# Patient Record
Sex: MALE | Race: BLACK OR AFRICAN AMERICAN | NOT HISPANIC OR LATINO | Employment: FULL TIME | URBAN - METROPOLITAN AREA
[De-identification: names, ages, dates, MRNs, and addresses within clinical notes are randomized per-mention and may not be internally consistent; named-entity substitution may affect disease eponyms.]

---

## 2018-08-01 NOTE — PROGRESS NOTES
This note was created by combination of typed  and Dragon dictation.  Transcription errors may be present.  If there are any questions, please contact me.    Assessment & Plan:   Normal physical exam  Class 2 obesity due to excess calories without serious comorbidity with body mass index (BMI) of 37.0 to 37.9 in adult  -fam hx of DM and fam hx of sickle cell.  He should at least have sickle trait.   Recommended diet modification/portion control, and weight loss given fam hx of DM.  -     CBC auto differential; Future; Expected date: 08/02/2018  -     Comprehensive metabolic panel; Future; Expected date: 08/02/2018  -     Lipid panel; Future; Expected date: 08/02/2018  -     Hemoglobin A1c; Future; Expected date: 08/02/2018  -     TSH; Future; Expected date: 08/02/2018  -     PSA, Screening; Future; Expected date: 08/02/2018    Screening for colon cancer  -     Case request GI: COLONOSCOPY    Need for Tdap vaccination  -     (In Office Administered) Tdap Vaccine      There are no discontinued medications.  Modified Medications    No medications on file     New Prescriptions    No medications on file       Follow Up: No Follow-up on file. if all normal PEx 1 year.        Subjective:     Chief Complaint   Patient presents with    Annual Exam       HPI  Terrie is a 53 y.o. male, last appointment with this clinic was Visit date not found.    NP  Reports a hx of hyperlipidemia not enough to warrant medication.  This was maybe 4-5 years ago.   Reports an episode of hypoglycemia a year ago at [facility] - a screening test - asymptomatic.   fam hx of sickle cell - 2 brothers.    Ate a banana today.     BMI 37. Discussed importance of maintaining lower BMI and impact on chronic health conditions and with fam hx of DM, risk of DM himself.    Diet - home cooking. No added salts.  Adequate vegetables.  Sometimes diet Coke.    Physical activity - with work.     Patient Care Team:  Inderjit Loco MD as PCP - General  (Internal Medicine)    There are no active problems to display for this patient.      PAST MEDICAL HISTORY:  History reviewed. No pertinent past medical history.    PAST SURGICAL HISTORY:  History reviewed. No pertinent surgical history.    Family History   Problem Relation Age of Onset    Heart disease Mother     Diabetes Mother     Diabetes Father     No Known Problems Sister     Diabetes Brother     Sickle cell anemia Brother     Sickle cell anemia Brother     No Known Problems Sister     No Known Problems Sister     No Known Problems Sister     No Known Problems Brother     No Known Problems Brother     No Known Problems Daughter     No Known Problems Daughter     No Known Problems Son        SOCIAL HISTORY:  Social History     Social History    Marital status:      Spouse name: N/A    Number of children: N/A    Years of education: N/A     Occupational History    [employer] - .       Social History Main Topics    Smoking status: Never Smoker    Smokeless tobacco: Never Used    Alcohol use 2.4 oz/week     4 Cans of beer per week      Comment: 3-4 times a week, 1 at a time    Drug use: No    Sexual activity: Yes     Other Topics Concern    Not on file     Social History Narrative    No narrative on file       ALLERGIES AND MEDICATIONS: updated and reviewed.  Review of patient's allergies indicates:  Allergies not on file  No current outpatient prescriptions on file.     No current facility-administered medications for this visit.        Review of Systems   Constitutional: Negative for fever, malaise/fatigue and weight loss.   HENT: Negative for congestion.    Eyes: Negative for blurred vision and pain.   Respiratory: Negative for shortness of breath and wheezing.    Cardiovascular: Negative for chest pain, palpitations and leg swelling.   Gastrointestinal: Negative for abdominal pain, blood in stool, constipation, diarrhea and heartburn.   Genitourinary:  "Negative for dysuria, hematuria and urgency.   Musculoskeletal: Negative for joint pain.   Neurological: Negative for tingling, focal weakness, weakness and headaches.       Objective:   Physical Exam   Vitals:    08/02/18 1032   BP: 130/80   Pulse: 84   Temp: 98.1 °F (36.7 °C)   SpO2: 97%   Weight: 114.9 kg (253 lb 6.7 oz)   Height: 5' 9" (1.753 m)    Body mass index is 37.42 kg/m².  Weight: 114.9 kg (253 lb 6.7 oz)   Height: 5' 9" (175.3 cm)     Physical Exam   Constitutional: He is oriented to person, place, and time. He appears well-developed and well-nourished.   HENT:   Right Ear: Tympanic membrane and external ear normal.   Left Ear: Tympanic membrane and external ear normal.   Nose: Nose normal.   Mouth/Throat: Oropharynx is clear and moist.   Eyes: EOM are normal.   Neck: Trachea normal. Carotid bruit is not present. No thyroid mass and no thyromegaly present.   Cardiovascular: Normal rate, regular rhythm, S1 normal, S2 normal, normal heart sounds and intact distal pulses.    No murmur heard.  Pulmonary/Chest: Effort normal and breath sounds normal.   Abdominal: Soft. He exhibits no mass. There is no splenomegaly or hepatomegaly. There is no tenderness.   Musculoskeletal: He exhibits no edema or deformity.   Lymphadenopathy:     He has no cervical adenopathy.     He has no axillary adenopathy.   Neurological: He is alert and oriented to person, place, and time. He has normal strength and normal reflexes. No cranial nerve deficit or sensory deficit.   Skin: Skin is warm and dry. No rash (on exposed skin) noted.   On exposed skin   Psychiatric: He has a normal mood and affect. His speech is normal and behavior is normal. Thought content normal.     "

## 2018-08-02 ENCOUNTER — OFFICE VISIT (OUTPATIENT)
Dept: FAMILY MEDICINE | Facility: CLINIC | Age: 53
End: 2018-08-02
Payer: COMMERCIAL

## 2018-08-02 ENCOUNTER — LAB VISIT (OUTPATIENT)
Dept: LAB | Facility: HOSPITAL | Age: 53
End: 2018-08-02
Attending: INTERNAL MEDICINE
Payer: COMMERCIAL

## 2018-08-02 VITALS
WEIGHT: 253.44 LBS | OXYGEN SATURATION: 97 % | HEART RATE: 84 BPM | DIASTOLIC BLOOD PRESSURE: 80 MMHG | HEIGHT: 69 IN | SYSTOLIC BLOOD PRESSURE: 130 MMHG | TEMPERATURE: 98 F | BODY MASS INDEX: 37.54 KG/M2

## 2018-08-02 DIAGNOSIS — E66.09 CLASS 2 OBESITY DUE TO EXCESS CALORIES WITHOUT SERIOUS COMORBIDITY WITH BODY MASS INDEX (BMI) OF 37.0 TO 37.9 IN ADULT: ICD-10-CM

## 2018-08-02 DIAGNOSIS — Z23 NEED FOR TDAP VACCINATION: ICD-10-CM

## 2018-08-02 DIAGNOSIS — Z00.00 NORMAL PHYSICAL EXAM: Primary | ICD-10-CM

## 2018-08-02 DIAGNOSIS — Z00.00 NORMAL PHYSICAL EXAM: ICD-10-CM

## 2018-08-02 DIAGNOSIS — Z12.11 SCREENING FOR COLON CANCER: ICD-10-CM

## 2018-08-02 LAB
ALBUMIN SERPL BCP-MCNC: 3.9 G/DL
ALP SERPL-CCNC: 53 U/L
ALT SERPL W/O P-5'-P-CCNC: 28 U/L
ANION GAP SERPL CALC-SCNC: 7 MMOL/L
AST SERPL-CCNC: 29 U/L
BASOPHILS # BLD AUTO: 0.04 K/UL
BASOPHILS NFR BLD: 0.8 %
BILIRUB SERPL-MCNC: 0.5 MG/DL
BUN SERPL-MCNC: 15 MG/DL
CALCIUM SERPL-MCNC: 9.2 MG/DL
CHLORIDE SERPL-SCNC: 104 MMOL/L
CHOLEST SERPL-MCNC: 223 MG/DL
CHOLEST/HDLC SERPL: 4.2 {RATIO}
CO2 SERPL-SCNC: 25 MMOL/L
COMPLEXED PSA SERPL-MCNC: 0.25 NG/ML
CREAT SERPL-MCNC: 1.2 MG/DL
DIFFERENTIAL METHOD: ABNORMAL
EOSINOPHIL # BLD AUTO: 0.2 K/UL
EOSINOPHIL NFR BLD: 3.5 %
ERYTHROCYTE [DISTWIDTH] IN BLOOD BY AUTOMATED COUNT: 14.5 %
EST. GFR  (AFRICAN AMERICAN): >60 ML/MIN/1.73 M^2
EST. GFR  (NON AFRICAN AMERICAN): >60 ML/MIN/1.73 M^2
ESTIMATED AVG GLUCOSE: 126 MG/DL
GLUCOSE SERPL-MCNC: 98 MG/DL
HBA1C MFR BLD HPLC: 6 %
HCT VFR BLD AUTO: 44.8 %
HDLC SERPL-MCNC: 53 MG/DL
HDLC SERPL: 23.8 %
HGB BLD-MCNC: 14.1 G/DL
IMM GRANULOCYTES # BLD AUTO: 0.01 K/UL
IMM GRANULOCYTES NFR BLD AUTO: 0.2 %
LDLC SERPL CALC-MCNC: 144.4 MG/DL
LYMPHOCYTES # BLD AUTO: 2 K/UL
LYMPHOCYTES NFR BLD: 38.6 %
MCH RBC QN AUTO: 27.7 PG
MCHC RBC AUTO-ENTMCNC: 31.5 G/DL
MCV RBC AUTO: 88 FL
MONOCYTES # BLD AUTO: 0.7 K/UL
MONOCYTES NFR BLD: 13.1 %
NEUTROPHILS # BLD AUTO: 2.2 K/UL
NEUTROPHILS NFR BLD: 43.8 %
NONHDLC SERPL-MCNC: 170 MG/DL
NRBC BLD-RTO: 0 /100 WBC
PLATELET # BLD AUTO: 218 K/UL
PMV BLD AUTO: 10.5 FL
POTASSIUM SERPL-SCNC: 4.3 MMOL/L
PROT SERPL-MCNC: 7.8 G/DL
RBC # BLD AUTO: 5.09 M/UL
SODIUM SERPL-SCNC: 136 MMOL/L
TRIGL SERPL-MCNC: 128 MG/DL
TSH SERPL DL<=0.005 MIU/L-ACNC: 0.76 UIU/ML
WBC # BLD AUTO: 5.11 K/UL

## 2018-08-02 PROCEDURE — 99999 PR PBB SHADOW E&M-EST. PATIENT-LVL III: CPT | Mod: PBBFAC,,, | Performed by: INTERNAL MEDICINE

## 2018-08-02 PROCEDURE — 80061 LIPID PANEL: CPT

## 2018-08-02 PROCEDURE — 90471 IMMUNIZATION ADMIN: CPT | Mod: S$GLB,,, | Performed by: INTERNAL MEDICINE

## 2018-08-02 PROCEDURE — 99386 PREV VISIT NEW AGE 40-64: CPT | Mod: 25,S$GLB,, | Performed by: INTERNAL MEDICINE

## 2018-08-02 PROCEDURE — 84153 ASSAY OF PSA TOTAL: CPT

## 2018-08-02 PROCEDURE — 84443 ASSAY THYROID STIM HORMONE: CPT

## 2018-08-02 PROCEDURE — 80053 COMPREHEN METABOLIC PANEL: CPT

## 2018-08-02 PROCEDURE — 83036 HEMOGLOBIN GLYCOSYLATED A1C: CPT

## 2018-08-02 PROCEDURE — 36415 COLL VENOUS BLD VENIPUNCTURE: CPT | Mod: PO

## 2018-08-02 PROCEDURE — 85025 COMPLETE CBC W/AUTO DIFF WBC: CPT

## 2018-08-02 PROCEDURE — 90715 TDAP VACCINE 7 YRS/> IM: CPT | Mod: S$GLB,,, | Performed by: INTERNAL MEDICINE

## 2018-08-03 DIAGNOSIS — Z11.59 NEED FOR HEPATITIS C SCREENING TEST: ICD-10-CM

## 2018-08-03 PROBLEM — R73.03 PREDIABETES: Status: ACTIVE | Noted: 2018-08-03

## 2018-08-03 NOTE — PROGRESS NOTES
a1c preDM 6.0  Lipid not ideal with elevated glc nonHDL < 190  PSA WNL  CBC WNL; fam hx of sickle cell; normocytic  TSH WNL  Results to pt. Had talked about importance of diet/weight loss at OV.  PEx 1 year.

## 2018-10-12 ENCOUNTER — PATIENT MESSAGE (OUTPATIENT)
Dept: FAMILY MEDICINE | Facility: CLINIC | Age: 53
End: 2018-10-12

## 2018-10-17 ENCOUNTER — PATIENT MESSAGE (OUTPATIENT)
Dept: FAMILY MEDICINE | Facility: CLINIC | Age: 53
End: 2018-10-17

## 2018-10-29 DIAGNOSIS — Z12.11 COLON CANCER SCREENING: Primary | ICD-10-CM

## 2018-11-12 PROBLEM — Z12.11 SCREENING FOR COLON CANCER: Status: ACTIVE | Noted: 2018-11-12

## 2018-11-19 ENCOUNTER — ANESTHESIA EVENT (OUTPATIENT)
Dept: ENDOSCOPY | Facility: HOSPITAL | Age: 53
End: 2018-11-19
Payer: COMMERCIAL

## 2018-11-19 ENCOUNTER — HOSPITAL ENCOUNTER (OUTPATIENT)
Facility: HOSPITAL | Age: 53
Discharge: HOME | End: 2018-11-19
Attending: COLON & RECTAL SURGERY | Admitting: COLON & RECTAL SURGERY
Payer: COMMERCIAL

## 2018-11-19 ENCOUNTER — ANESTHESIA (OUTPATIENT)
Dept: ENDOSCOPY | Facility: HOSPITAL | Age: 53
End: 2018-11-19
Payer: COMMERCIAL

## 2018-11-19 VITALS
BODY MASS INDEX: 35.55 KG/M2 | SYSTOLIC BLOOD PRESSURE: 135 MMHG | HEART RATE: 78 BPM | WEIGHT: 240 LBS | OXYGEN SATURATION: 97 % | TEMPERATURE: 98 F | HEIGHT: 69 IN | DIASTOLIC BLOOD PRESSURE: 71 MMHG | RESPIRATION RATE: 15 BRPM

## 2018-11-19 DIAGNOSIS — Z12.11 SCREENING FOR COLON CANCER: Primary | ICD-10-CM

## 2018-11-19 PROCEDURE — 37000009 HC ANESTHESIA EA ADD 15 MINS: Performed by: COLON & RECTAL SURGERY

## 2018-11-19 PROCEDURE — 45380 COLONOSCOPY AND BIOPSY: CPT | Mod: 33,,, | Performed by: COLON & RECTAL SURGERY

## 2018-11-19 PROCEDURE — 27201012 HC FORCEPS, HOT/COLD, DISP: Performed by: COLON & RECTAL SURGERY

## 2018-11-19 PROCEDURE — 37000008 HC ANESTHESIA 1ST 15 MINUTES: Performed by: COLON & RECTAL SURGERY

## 2018-11-19 PROCEDURE — 45380 COLONOSCOPY AND BIOPSY: CPT | Performed by: COLON & RECTAL SURGERY

## 2018-11-19 PROCEDURE — 25000003 PHARM REV CODE 250: Performed by: NURSE PRACTITIONER

## 2018-11-19 PROCEDURE — 63600175 PHARM REV CODE 636 W HCPCS: Performed by: NURSE ANESTHETIST, CERTIFIED REGISTERED

## 2018-11-19 PROCEDURE — E9220 PRA ENDO ANESTHESIA: HCPCS | Mod: 33,,, | Performed by: NURSE ANESTHETIST, CERTIFIED REGISTERED

## 2018-11-19 PROCEDURE — 88305 TISSUE EXAM BY PATHOLOGIST: CPT | Mod: 26,,, | Performed by: PATHOLOGY

## 2018-11-19 PROCEDURE — 88305 TISSUE EXAM BY PATHOLOGIST: CPT | Performed by: PATHOLOGY

## 2018-11-19 RX ORDER — PROPOFOL 10 MG/ML
VIAL (ML) INTRAVENOUS CONTINUOUS PRN
Status: DISCONTINUED | OUTPATIENT
Start: 2018-11-19 | End: 2018-11-19

## 2018-11-19 RX ORDER — SODIUM CHLORIDE 9 MG/ML
INJECTION, SOLUTION INTRAVENOUS CONTINUOUS
Status: DISCONTINUED | OUTPATIENT
Start: 2018-11-19 | End: 2023-04-04

## 2018-11-19 RX ORDER — PROPOFOL 10 MG/ML
VIAL (ML) INTRAVENOUS
Status: DISCONTINUED | OUTPATIENT
Start: 2018-11-19 | End: 2018-11-19

## 2018-11-19 RX ORDER — LIDOCAINE HCL/PF 100 MG/5ML
SYRINGE (ML) INTRAVENOUS
Status: DISCONTINUED | OUTPATIENT
Start: 2018-11-19 | End: 2018-11-19

## 2018-11-19 RX ORDER — SODIUM CHLORIDE 0.9 % (FLUSH) 0.9 %
3 SYRINGE (ML) INJECTION
Status: DISCONTINUED | OUTPATIENT
Start: 2018-11-19 | End: 2021-07-06

## 2018-11-19 RX ADMIN — PROPOFOL 50 MG: 10 INJECTION, EMULSION INTRAVENOUS at 03:11

## 2018-11-19 RX ADMIN — LIDOCAINE HYDROCHLORIDE 100 MG: 20 INJECTION, SOLUTION INTRAVENOUS at 03:11

## 2018-11-19 RX ADMIN — PROPOFOL 70 MG: 10 INJECTION, EMULSION INTRAVENOUS at 03:11

## 2018-11-19 RX ADMIN — PROPOFOL 200 MCG/KG/MIN: 10 INJECTION, EMULSION INTRAVENOUS at 03:11

## 2018-11-19 RX ADMIN — SODIUM CHLORIDE: 0.9 INJECTION, SOLUTION INTRAVENOUS at 03:11

## 2018-11-19 RX ADMIN — SODIUM CHLORIDE: 0.9 INJECTION, SOLUTION INTRAVENOUS at 02:11

## 2018-11-19 NOTE — TRANSFER OF CARE
"Anesthesia Transfer of Care Note    Patient: Terrie Schroeder    Procedure(s) Performed: Procedure(s) (LRB):  COLONOSCOPY (N/A)    Patient location: PACU    Anesthesia Type: general    Transport from OR: Transported from OR on 6-10 L/min O2 by face mask with adequate spontaneous ventilation    Post pain: adequate analgesia    Post assessment: no apparent anesthetic complications and tolerated procedure well    Post vital signs: stable    Level of consciousness: sedated and responds to stimulation    Nausea/Vomiting: no nausea/vomiting    Complications: none    Transfer of care protocol was followed      Last vitals:   Visit Vitals  BP (!) 137/94 (BP Location: Left arm, Patient Position: Lying)   Pulse 86   Temp 37.1 °C (98.8 °F) (Temporal)   Resp 16   Ht 5' 9" (1.753 m)   Wt 108.9 kg (240 lb)   SpO2 97%   BMI 35.44 kg/m²     "

## 2018-11-19 NOTE — [employer]ATION PATIENT INSTRUCTIONS
Discharge Summary/Instructions after an Endoscopic Procedure  Patient Name: Terrie Schroeder  Patient MRN: [id number]  Patient YOB: 1965 Monday, November 19, 2018  Chapito Schneider MD  RESTRICTIONS:  During your procedure today, you received medications for sedation.  These   medications may affect your judgment, balance and coordination.  Therefore,   for 24 hours, you have the following restrictions:   - DO NOT drive a car, operate machinery, make legal/financial decisions,   sign important papers or drink alcohol.    ACTIVITY:  Today: no heavy lifting, straining or running due to procedural   sedation/anesthesia.  The following day: return to full activity including work.  DIET:  Eat and drink normally unless instructed otherwise.     TREATMENT FOR COMMON SIDE EFFECTS:  - Mild abdominal pain, nausea, belching, bloating or excessive gas:  rest,   eat lightly and use a heating pad.  - Sore Throat: treat with throat lozenges and/or gargle with warm salt   water.  - Because air was used during the procedure, expelling large amounts of air   from your rectum or belching is normal.  - If a bowel prep was taken, you may not have a bowel movement for 1-3 days.    This is normal.  SYMPTOMS TO WATCH FOR AND REPORT TO YOUR PHYSICIAN:  1. Abdominal pain or bloating, other than gas cramps.  2. Chest pain.  3. Back pain.  4. Signs of infection such as: chills or fever occurring within 24 hours   after the procedure.  5. Rectal bleeding, which would show as bright red, maroon, or black stools.   (A tablespoon of blood from the rectum is not serious, especially if   hemorrhoids are present.)  6. Vomiting.  7. Weakness or dizziness.  GO DIRECTLY TO THE NEAREST EMERGENCY ROOM IF YOU HAVE ANY OF THE FOLLOWING:      Difficulty breathing              Chills and/or fever over 101 F   Persistent vomiting and/or vomiting blood   Severe abdominal pain   Severe chest pain   Black, tarry stools   Bleeding- more than one  tablespoon   Any other symptom or condition that you feel may need urgent attention  Your doctor recommends these additional instructions:  If any biopsies were taken, your doctors clinic will contact you in 1 to 2   weeks with any results.  - Discharge patient to home (ambulatory).   - Patient has a contact number available for emergencies.  The signs and   symptoms of potential delayed complications were discussed with the   patient.  Return to normal activities tomorrow.  Written discharge   instructions were provided to the patient.   - Resume previous diet.   - Continue present medications.   - Await pathology results.   - Repeat colonoscopy in 3 - 5 years for surveillance based on pathology   results.  For questions, problems or results please call your physician - Chapito Schneider MD at Work:  [phone].  [facility] [place], EMERGENCY ROOM PHONE NUMBER: [phone]  IF A COMPLICATION OR EMERGENCY SITUATION ARISES AND YOU ARE UNABLE TO REACH   YOUR PHYSICIAN - GO DIRECTLY TO THE EMERGENCY ROOM.  Chapito Schneider MD  11/19/2018 3:44:17 PM  This report has been verified and signed electronically.  [employer]

## 2018-11-19 NOTE — H&P
Colonoscopy History and Physical      Procedure : Colonoscopy    Indications:  asymptomatic screening exam    Family Hx of CRC: denies    Last Colonoscopy:  denies    Hx of sedation problems: none  FHX of sedation problems: none    History reviewed. No pertinent past medical history.    History reviewed. No pertinent surgical history.    Review of patient's allergies indicates:  No Known Allergies    No current facility-administered medications on file prior to encounter.      No current outpatient medications on file prior to encounter.       Family History   Problem Relation Age of Onset    Heart disease Mother     Diabetes Mother     Diabetes Father     No Known Problems Sister     Diabetes Brother     Sickle cell anemia Brother     Sickle cell anemia Brother     No Known Problems Sister     No Known Problems Sister     No Known Problems Sister     No Known Problems Brother     No Known Problems Brother     No Known Problems Daughter     No Known Problems Daughter     No Known Problems Son        Social History     Socioeconomic History    Marital status:      Spouse name: Not on file    Number of children: Not on file    Years of education: Not on file    Highest education level: Not on file   Social Needs    Financial resource strain: Not on file    Food insecurity - worry: Not on file    Food insecurity - inability: Not on file    Transportation needs - medical: Not on file    Transportation needs - non-medical: Not on file   Occupational History    Occupation: [employer] - .    Tobacco Use    Smoking status: Never Smoker    Smokeless tobacco: Never Used   Substance and Sexual Activity    Alcohol use: Yes     Alcohol/week: 2.4 oz     Types: 4 Cans of beer per week     Comment: 3-4 times a week, 1 at a time    Drug use: No    Sexual activity: Yes   Other Topics Concern    Not on file   Social History Narrative    Not on file       Review of  Systems -   Respiratory ROS: negative  Cardiovascular ROS: negative  Gastrointestinal ROS: negative  Musculoskeletal ROS: negative  Neurological ROS: negative    Physical Exam:  General: no distress  Head: normocephalic  Oropharynx clear, Mallampati   Lungs:  normal respiratory effort  Heart: regular rate  Abdomen: soft,  Non-tender  Extremities: warm and well perfused  Neuro awake and alert    ASA: I    Patient cleared for Anesthesia:  MAC    Anesthesia/Surgery risks, benefits, and alternative options discussed and understood by patient/family.

## 2018-11-19 NOTE — ANESTHESIA PREPROCEDURE EVALUATION
11/19/2018  Terrie Schroeder is a 53 y.o., male here for a routine screening colonoscopy.     Patient Active Problem List   Diagnosis    Class 2 obesity due to excess calories without serious comorbidity with body mass index (BMI) of 37.0 to 37.9 in adult    Prediabetes    Screening for colon cancer     History reviewed. No pertinent past medical history.  History reviewed. No pertinent surgical history.      Anesthesia Evaluation    I have reviewed the Patient Summary Reports.     I have reviewed the Medications.     Review of Systems  Anesthesia Hx:  No problems with previous Anesthesia  Denies Family Hx of Anesthesia complications.   Denies Personal Hx of Anesthesia complications.   Hematology/Oncology:  Hematology Normal   Oncology Normal     EENT/Dental:EENT/Dental Normal   Cardiovascular:  Cardiovascular Normal     Pulmonary:  Pulmonary Normal    Renal/:  Renal/ Normal     Hepatic/GI:  Hepatic/GI Normal    Musculoskeletal:  Musculoskeletal Normal    Neurological:  Neurology Normal    Endocrine:  Endocrine Normal    Dermatological:  Skin Normal    Psych:  Psychiatric Normal           Physical Exam  General:  Well nourished, Obesity    Airway/Jaw/Neck:  Airway Findings: Mouth Opening: Normal Tongue: Normal  General Airway Assessment: Adult  Mallampati: II  Improves to I with phonation.  TM Distance: Normal, at least 6 cm         Dental:  Dental Findings: In tact   Chest/Lungs:  Chest/Lungs Findings: Clear to auscultation, Normal Respiratory Rate     Heart/Vascular:  Heart Findings: Rate: Normal  Rhythm: Regular Rhythm  Sounds: Normal     Abdomen:  Abdomen Findings: Normal    Musculoskeletal:  Musculoskeletal Findings: Normal   Skin:  Skin Findings: Normal    Mental Status:  Mental Status Findings:  Cooperative, Alert and Oriented         Anesthesia Plan  Type of Anesthesia, risks & benefits  discussed:  Anesthesia Type:  general  Patient's Preference:   Intra-op Monitoring Plan: standard ASA monitors  Intra-op Monitoring Plan Comments:   Post Op Pain Control Plan: per primary service following discharge from PACU  Post Op Pain Control Plan Comments:   Induction:   IV  Beta Blocker:  Patient is not currently on a Beta-Blocker (No further documentation required).       Informed Consent: Patient understands risks and agrees with Anesthesia plan.  Questions answered. Anesthesia consent signed with patient.  ASA Score: 2     Day of Surgery Review of History & Physical: I have interviewed and examined the patient. I have reviewed the patient's H&P dated:  There are no significant changes.  H&P update referred to the provider.         Ready For Surgery From Anesthesia Perspective.

## 2018-11-20 NOTE — ANESTHESIA POSTPROCEDURE EVALUATION
"Anesthesia Post Evaluation    Patient: Terrie Schroeder    Procedure(s) Performed: Procedure(s) (LRB):  COLONOSCOPY (N/A)    Final Anesthesia Type: general  Patient location during evaluation: GI PACU  Patient participation: Yes- Able to Participate  Level of consciousness: awake and alert and oriented  Post-procedure vital signs: reviewed and stable  Pain management: adequate  Airway patency: patent  PONV status at discharge: No PONV  Anesthetic complications: no      Cardiovascular status: blood pressure returned to baseline  Respiratory status: unassisted, spontaneous ventilation and room air  Hydration status: euvolemic  Follow-up not needed.        Visit Vitals  /71 (BP Location: Left arm, Patient Position: Sitting)   Pulse 78   Temp 36.5 °C (97.7 °F) (Temporal)   Resp 15   Ht 5' 9" (1.753 m)   Wt 108.9 kg (240 lb)   SpO2 97%   BMI 35.44 kg/m²       Pain/Christa Score: Pain Assessment Performed: Yes (11/19/2018  4:09 PM)  Presence of Pain: denies (11/19/2018  4:09 PM)  Christa Score: 10 (11/19/2018  4:09 PM)        "

## 2018-11-21 PROBLEM — K63.5 POLYP, COLONIC: Status: ACTIVE | Noted: 2018-11-12

## 2018-11-26 ENCOUNTER — TELEPHONE (OUTPATIENT)
Dept: ENDOSCOPY | Facility: HOSPITAL | Age: 53
End: 2018-11-26

## 2018-12-27 NOTE — PROGRESS NOTES
SPECIMEN  1) Cecal polyp x2 2 mm and 2 mm.  FINAL PATHOLOGIC DIAGNOSIS  Cecal polyps, biopsy:  Fragments of colonic mucosa with a prominent lymphoid aggregate.  Negative for dysplasia.  Diagnosed by: Ezra Davis M.D.  (Electronically Signed: 2018-11-27 15:59:07)    Biopsy showed non-neoplastic tissue which is NOT pathologic and does NOT require surveillance or further testing.      I would recommend repeat colonoscopy in 10 years    If you have any questions or if I can clarify any of the above please contact me:      Mobile [phone]   Pager [phone]  email [email or web address]  EPIC message  Nurse Elise Posadas [phone]   Jocelyne May:  [phone]    Sincerely  HChapito MD, FACS, FASCRS  Staff Surgeon  Dept of Colon and Rectal Surgery

## 2020-07-10 DIAGNOSIS — Z11.59 NEED FOR HEPATITIS C SCREENING TEST: ICD-10-CM

## 2020-10-05 ENCOUNTER — PATIENT MESSAGE (OUTPATIENT)
Dept: ADMINISTRATIVE | Facility: HOSPITAL | Age: 55
End: 2020-10-05

## 2021-01-04 ENCOUNTER — PATIENT MESSAGE (OUTPATIENT)
Dept: ADMINISTRATIVE | Facility: HOSPITAL | Age: 56
End: 2021-01-04

## 2021-04-06 ENCOUNTER — PATIENT MESSAGE (OUTPATIENT)
Dept: ADMINISTRATIVE | Facility: HOSPITAL | Age: 56
End: 2021-04-06

## 2021-04-16 ENCOUNTER — PATIENT MESSAGE (OUTPATIENT)
Dept: RESEARCH | Facility: HOSPITAL | Age: 56
End: 2021-04-16

## 2021-07-02 PROBLEM — R00.2 PALPITATIONS: Status: RESOLVED | Noted: 2021-01-26 | Resolved: 2021-07-02

## 2021-07-02 PROBLEM — R00.2 PALPITATIONS: Status: ACTIVE | Noted: 2021-01-26

## 2021-07-02 PROBLEM — R00.0 SINUS TACHYCARDIA: Status: ACTIVE | Noted: 2021-01-26

## 2021-07-02 PROBLEM — E78.5 HLD (HYPERLIPIDEMIA): Status: ACTIVE | Noted: 2021-01-26

## 2021-07-06 ENCOUNTER — HOSPITAL ENCOUNTER (OUTPATIENT)
Dept: RADIOLOGY | Facility: HOSPITAL | Age: 56
Discharge: HOME | End: 2021-07-06
Attending: INTERNAL MEDICINE
Payer: COMMERCIAL

## 2021-07-06 ENCOUNTER — OFFICE VISIT (OUTPATIENT)
Dept: FAMILY MEDICINE | Facility: CLINIC | Age: 56
End: 2021-07-06
Payer: COMMERCIAL

## 2021-07-06 ENCOUNTER — LAB VISIT (OUTPATIENT)
Dept: LAB | Facility: HOSPITAL | Age: 56
End: 2021-07-06
Attending: INTERNAL MEDICINE
Payer: COMMERCIAL

## 2021-07-06 VITALS
TEMPERATURE: 97 F | OXYGEN SATURATION: 96 % | BODY MASS INDEX: 36.96 KG/M2 | HEART RATE: 88 BPM | HEIGHT: 69 IN | WEIGHT: 249.56 LBS | DIASTOLIC BLOOD PRESSURE: 76 MMHG | SYSTOLIC BLOOD PRESSURE: 118 MMHG

## 2021-07-06 DIAGNOSIS — Z87.01 HISTORY OF BACTERIAL PNEUMONIA: ICD-10-CM

## 2021-07-06 DIAGNOSIS — Z23 NEED FOR SHINGLES VACCINE: ICD-10-CM

## 2021-07-06 DIAGNOSIS — Z00.00 NORMAL PHYSICAL EXAM: ICD-10-CM

## 2021-07-06 DIAGNOSIS — Z12.11 SCREENING FOR COLON CANCER: ICD-10-CM

## 2021-07-06 DIAGNOSIS — Z00.00 NORMAL PHYSICAL EXAM: Primary | ICD-10-CM

## 2021-07-06 DIAGNOSIS — R73.03 PREDIABETES: ICD-10-CM

## 2021-07-06 DIAGNOSIS — Z11.59 NEED FOR HEPATITIS C SCREENING TEST: ICD-10-CM

## 2021-07-06 DIAGNOSIS — Z11.4 ENCOUNTER FOR SCREENING FOR HIV: ICD-10-CM

## 2021-07-06 LAB
ALBUMIN SERPL BCP-MCNC: 4.1 G/DL (ref 3.5–5.2)
ALP SERPL-CCNC: 51 U/L (ref 55–135)
ALT SERPL W/O P-5'-P-CCNC: 27 U/L (ref 10–44)
ANION GAP SERPL CALC-SCNC: 8 MMOL/L (ref 8–16)
AST SERPL-CCNC: 32 U/L (ref 10–40)
BASOPHILS # BLD AUTO: 0.05 K/UL (ref 0–0.2)
BASOPHILS NFR BLD: 1 % (ref 0–1.9)
BILIRUB SERPL-MCNC: 0.3 MG/DL (ref 0.1–1)
BUN SERPL-MCNC: 23 MG/DL (ref 6–20)
CALCIUM SERPL-MCNC: 9.4 MG/DL (ref 8.7–10.5)
CHLORIDE SERPL-SCNC: 104 MMOL/L (ref 95–110)
CO2 SERPL-SCNC: 24 MMOL/L (ref 23–29)
COMPLEXED PSA SERPL-MCNC: 0.29 NG/ML (ref 0–4)
CREAT SERPL-MCNC: 1.2 MG/DL (ref 0.5–1.4)
DIFFERENTIAL METHOD: ABNORMAL
EOSINOPHIL # BLD AUTO: 0.1 K/UL (ref 0–0.5)
EOSINOPHIL NFR BLD: 2.7 % (ref 0–8)
ERYTHROCYTE [DISTWIDTH] IN BLOOD BY AUTOMATED COUNT: 14.4 % (ref 11.5–14.5)
EST. GFR  (AFRICAN AMERICAN): >60 ML/MIN/1.73 M^2
EST. GFR  (NON AFRICAN AMERICAN): >60 ML/MIN/1.73 M^2
ESTIMATED AVG GLUCOSE: 126 MG/DL (ref 68–131)
GLUCOSE SERPL-MCNC: 113 MG/DL (ref 70–110)
HBA1C MFR BLD: 6 % (ref 4–5.6)
HCT VFR BLD AUTO: 44.5 % (ref 40–54)
HCV AB SERPL QL IA: NEGATIVE
HGB BLD-MCNC: 13.6 G/DL (ref 14–18)
HIV 1+2 AB+HIV1 P24 AG SERPL QL IA: NEGATIVE
IMM GRANULOCYTES # BLD AUTO: 0 K/UL (ref 0–0.04)
IMM GRANULOCYTES NFR BLD AUTO: 0 % (ref 0–0.5)
LYMPHOCYTES # BLD AUTO: 1.8 K/UL (ref 1–4.8)
LYMPHOCYTES NFR BLD: 34.1 % (ref 18–48)
MCH RBC QN AUTO: 27.6 PG (ref 27–31)
MCHC RBC AUTO-ENTMCNC: 30.6 G/DL (ref 32–36)
MCV RBC AUTO: 90 FL (ref 82–98)
MONOCYTES # BLD AUTO: 0.7 K/UL (ref 0.3–1)
MONOCYTES NFR BLD: 12.7 % (ref 4–15)
NEUTROPHILS # BLD AUTO: 2.5 K/UL (ref 1.8–7.7)
NEUTROPHILS NFR BLD: 49.5 % (ref 38–73)
NRBC BLD-RTO: 0 /100 WBC
PLATELET # BLD AUTO: 215 K/UL (ref 150–450)
PMV BLD AUTO: 10.7 FL (ref 9.2–12.9)
POTASSIUM SERPL-SCNC: 4 MMOL/L (ref 3.5–5.1)
PROT SERPL-MCNC: 7.8 G/DL (ref 6–8.4)
RBC # BLD AUTO: 4.92 M/UL (ref 4.6–6.2)
SODIUM SERPL-SCNC: 136 MMOL/L (ref 136–145)
WBC # BLD AUTO: 5.13 K/UL (ref 3.9–12.7)

## 2021-07-06 PROCEDURE — 71046 X-RAY EXAM CHEST 2 VIEWS: CPT | Mod: 26,,, | Performed by: RADIOLOGY

## 2021-07-06 PROCEDURE — 99396 PREV VISIT EST AGE 40-64: CPT | Mod: 25,S$GLB,, | Performed by: INTERNAL MEDICINE

## 2021-07-06 PROCEDURE — 90471 IMMUNIZATION ADMIN: CPT | Mod: S$GLB,,, | Performed by: INTERNAL MEDICINE

## 2021-07-06 PROCEDURE — 90750 HZV VACC RECOMBINANT IM: CPT | Mod: S$GLB,,, | Performed by: INTERNAL MEDICINE

## 2021-07-06 PROCEDURE — 1126F AMNT PAIN NOTED NONE PRSNT: CPT | Mod: S$GLB,,, | Performed by: INTERNAL MEDICINE

## 2021-07-06 PROCEDURE — 3008F PR BODY MASS INDEX (BMI) DOCUMENTED: ICD-10-PCS | Mod: CPTII,S$GLB,, | Performed by: INTERNAL MEDICINE

## 2021-07-06 PROCEDURE — 71046 XR CHEST PA AND LATERAL: ICD-10-PCS | Mod: 26,,, | Performed by: RADIOLOGY

## 2021-07-06 PROCEDURE — 80053 COMPREHEN METABOLIC PANEL: CPT | Performed by: INTERNAL MEDICINE

## 2021-07-06 PROCEDURE — 71046 X-RAY EXAM CHEST 2 VIEWS: CPT | Mod: TC,FY,PO

## 2021-07-06 PROCEDURE — 83036 HEMOGLOBIN GLYCOSYLATED A1C: CPT | Performed by: INTERNAL MEDICINE

## 2021-07-06 PROCEDURE — 36415 COLL VENOUS BLD VENIPUNCTURE: CPT | Mod: PO | Performed by: INTERNAL MEDICINE

## 2021-07-06 PROCEDURE — 1126F PR PAIN SEVERITY QUANTIFIED, NO PAIN PRESENT: ICD-10-PCS | Mod: S$GLB,,, | Performed by: INTERNAL MEDICINE

## 2021-07-06 PROCEDURE — 84153 ASSAY OF PSA TOTAL: CPT | Performed by: INTERNAL MEDICINE

## 2021-07-06 PROCEDURE — 86803 HEPATITIS C AB TEST: CPT | Performed by: INTERNAL MEDICINE

## 2021-07-06 PROCEDURE — 99396 PR PREVENTIVE VISIT,EST,40-64: ICD-10-PCS | Mod: 25,S$GLB,, | Performed by: INTERNAL MEDICINE

## 2021-07-06 PROCEDURE — 90471 ZOSTER RECOMBINANT VACCINE: ICD-10-PCS | Mod: S$GLB,,, | Performed by: INTERNAL MEDICINE

## 2021-07-06 PROCEDURE — 85025 COMPLETE CBC W/AUTO DIFF WBC: CPT | Performed by: INTERNAL MEDICINE

## 2021-07-06 PROCEDURE — 99999 PR PBB SHADOW E&M-EST. PATIENT-LVL III: ICD-10-PCS | Mod: PBBFAC,,, | Performed by: INTERNAL MEDICINE

## 2021-07-06 PROCEDURE — 3008F BODY MASS INDEX DOCD: CPT | Mod: CPTII,S$GLB,, | Performed by: INTERNAL MEDICINE

## 2021-07-06 PROCEDURE — 99999 PR PBB SHADOW E&M-EST. PATIENT-LVL III: CPT | Mod: PBBFAC,,, | Performed by: INTERNAL MEDICINE

## 2021-07-06 PROCEDURE — 87389 HIV-1 AG W/HIV-1&-2 AB AG IA: CPT | Performed by: INTERNAL MEDICINE

## 2021-07-06 PROCEDURE — 90750 ZOSTER RECOMBINANT VACCINE: ICD-10-PCS | Mod: S$GLB,,, | Performed by: INTERNAL MEDICINE

## 2021-07-06 RX ORDER — LEVOFLOXACIN 750 MG/1
750 TABLET ORAL DAILY
COMMUNITY
Start: 2021-01-28 | End: 2021-07-06 | Stop reason: ALTCHOICE

## 2021-07-06 RX ORDER — ASCORBIC ACID 500 MG
500 TABLET ORAL
COMMUNITY

## 2021-07-06 RX ORDER — ZINC GLUCONATE 50 MG
50 TABLET ORAL
COMMUNITY

## 2021-07-06 RX ORDER — ERGOCALCIFEROL (VITAMIN D2) 10 MCG
5000 TABLET ORAL
COMMUNITY

## 2021-09-16 ENCOUNTER — TELEPHONE (OUTPATIENT)
Dept: FAMILY MEDICINE | Facility: CLINIC | Age: 56
End: 2021-09-16

## 2022-08-03 NOTE — PROGRESS NOTES
This note was created by combination of typed  and M-Modal dictation.  Transcription errors may be present.  If there are any questions, please contact me.    Assessment and Plan:   Normal physical exam  Screening for colon cancer 11/2018 colonoscopy normal lymphoid aggregate  Family history of sickle cell anemia  -fitkit  Check labs  Work on tlc  -     Comprehensive Metabolic Panel; Future; Expected date: 08/04/2022  -     Hemoglobin A1C; Future; Expected date: 08/04/2022  -     Lipid Panel; Future; Expected date: 08/04/2022  -     CBC Auto Differential; Future; Expected date: 08/04/2022  -     PSA, Screening; Future; Expected date: 08/05/2022  -     CBC Auto Differential; Future; Expected date: 08/04/2023  -     Comprehensive Metabolic Panel; Future; Expected date: 08/04/2023  -     Lipid Panel; Future; Expected date: 08/04/2023  -     PSA, Screening; Future; Expected date: 08/04/2023  -     Hemoglobin A1C; Future; Expected date: 08/04/2023    Mixed hyperlipidemia  Class 2 obesity due to excess calories without serious comorbidity with body mass index (BMI) of 37.0 to 37.9 in adult  Prediabetes  -check labs. Work on tlc.  Benefits of weight loss discussed. Eat more vegetables.    Need for shingles vaccine  -     (In Office Administered) Zoster Recombinant Vaccine    There are no discontinued medications.    meds sent this encounter:       Follow Up: No follow-ups on file. if labs stable PEx 1 year with labs    Subjective:   No chief complaint on file.      LORENZO Falcon is a 57 y.o. male, last appointment with this clinic was Visit date not found.  Social History     Tobacco Use    Smoking status: Never Smoker    Smokeless tobacco: Never Used   Substance Use Topics    Alcohol use: Yes     Alcohol/week: 4.0 standard drinks     Types: 4 Cans of beer per week     Comment: 3-4 times a week, 1 at a time      Social History     Occupational History    Occupation: [employer] - .        Social History     Social History Narrative    ** Merged History Encounter **            Last visit with me last year for physical  Pre diabetes work on TLC  History of pneumonia 01/2021  history dyslipidemia  Family history of sickle cell.  Labs 07/2021 mild anemia.  2018 colonoscopy normal    CMP elevated glucose otherwise normal  CBC mild anemia.  Colonoscopy 2018 was normal  A1c 6.0 stable pre diabetes  HIV, hep C screening negative  PSA normal  Results to patient via my chart.  Work on therapeutic lifestyle modification.  Physical exam 1 year    Taking zinc  Vit D ? Dose OTC  And vit C  And fishoil    Diet  Baked fish/chicken  Salads, some  Coke zero  Some bananas and grapes    Physical activity - with work; thinks should increase activity.    Light beer on weekends.  Estimates 2 beers at a time      Patient Care Team:  Inderjit Loco MD as PCP - General (Internal Medicine)  Osito Ruth MA as Care Coordinator    Patient Active Problem List    Diagnosis Date Noted    HLD (hyperlipidemia) 01/26/2021 02/25/2021 stress test  Impression: Negative stress echocardiogram          Sinus tachycardia 01/26/2021 03/03/2021 Holter monitor   Impression: 1.0 sinus rhythm with sinus tachycardia 2.0 rare PVC 3.0 rare PAC   02/05/2021 echo  Summary:  1. Estimated left ventricular ejection fraction is 60 to 65%.  2. Normal left ventricular systolic function.  3. Resting tachycardia.  4. Right ventricular systolic function is normal.      Screening for colon cancer 11/2018 colonoscopy normal lymphoid aggregate 11/12/2018    Prediabetes 08/03/2018    Class 2 obesity due to excess calories without serious comorbidity with body mass index (BMI) of 37.0 to 37.9 in adult 08/02/2018       PAST MEDICAL PROBLEMS, PAST SURGICAL HISTORY: please see relevant portions of the electronic medical record    ALLERGIES AND MEDICATIONS: updated and reviewed.  Review of patient's allergies indicates:  No Known  Allergies    Medication List with Changes/Refills   Current Medications    ASCORBIC ACID, VITAMIN C, (VITAMIN C) 500 MG TABLET    Take 500 mg by mouth.    ERGOCALCIFEROL, VITAMIN D2, 10 MCG (400 UNIT) TAB    Take 5,000 mg by mouth.    ZINC GLUCONATE 50 MG TABLET    Take 50 mg by mouth.      Review of Systems   Constitutional: Negative for fever, malaise/fatigue and weight loss.   HENT: Negative for congestion.    Eyes: Negative for blurred vision and pain.   Respiratory: Negative for shortness of breath and wheezing.    Cardiovascular: Negative for chest pain, palpitations and leg swelling.   Gastrointestinal: Negative for abdominal pain, blood in stool, constipation, diarrhea and heartburn.   Genitourinary: Negative for dysuria, hematuria and urgency.   Musculoskeletal: Negative for joint pain.   Neurological: Negative for tingling, focal weakness, weakness and headaches.   Psychiatric/Behavioral: Negative for depression. The patient is not nervous/anxious.        Objective:   Physical Exam   Vitals:    08/04/22 1116   BP: 122/80   BP Location: Left arm   Patient Position: Sitting   BP Method: Large (Manual)   Pulse: 86   Temp: 98 °F (36.7 °C)   TempSrc: Oral   SpO2: 96%   Weight: 115.6 kg (254 lb 15.4 oz)   Height: 6' (1.829 m)    Body mass index is 34.58 kg/m².            Physical Exam  Constitutional:       Appearance: Normal appearance. He is well-developed.   HENT:      Right Ear: Tympanic membrane and external ear normal.      Left Ear: Tympanic membrane and external ear normal.      Nose: Nose normal.   Eyes:      General: No scleral icterus.     Conjunctiva/sclera: Conjunctivae normal.   Neck:      Thyroid: No thyroid mass or thyromegaly.      Trachea: Trachea normal.   Cardiovascular:      Rate and Rhythm: Normal rate and regular rhythm.      Heart sounds: Normal heart sounds, S1 normal and S2 normal. No murmur heard.  Pulmonary:      Effort: Pulmonary effort is normal.      Breath sounds: Normal breath  sounds.   Abdominal:      General: There is no distension.      Palpations: Abdomen is soft. There is no hepatomegaly, splenomegaly or mass.      Tenderness: There is no abdominal tenderness.   Musculoskeletal:         General: No deformity.      Right lower leg: No edema.      Left lower leg: No edema.   Lymphadenopathy:      Cervical: No cervical adenopathy.   Skin:     General: Skin is warm and dry.      Findings: No rash (on exposed skin).      Comments: On exposed skin   Neurological:      Mental Status: He is alert and oriented to person, place, and time.      Cranial Nerves: No cranial nerve deficit.      Sensory: No sensory deficit.      Deep Tendon Reflexes: Reflexes are normal and symmetric.   Psychiatric:         Speech: Speech normal.         Behavior: Behavior normal.         Thought Content: Thought content normal.         Judgment: Judgment normal.

## 2022-08-04 ENCOUNTER — LAB VISIT (OUTPATIENT)
Dept: LAB | Facility: HOSPITAL | Age: 57
End: 2022-08-04
Attending: INTERNAL MEDICINE
Payer: COMMERCIAL

## 2022-08-04 ENCOUNTER — OFFICE VISIT (OUTPATIENT)
Dept: FAMILY MEDICINE | Facility: CLINIC | Age: 57
End: 2022-08-04
Payer: COMMERCIAL

## 2022-08-04 VITALS
DIASTOLIC BLOOD PRESSURE: 80 MMHG | OXYGEN SATURATION: 96 % | SYSTOLIC BLOOD PRESSURE: 122 MMHG | HEIGHT: 72 IN | HEART RATE: 86 BPM | BODY MASS INDEX: 34.53 KG/M2 | WEIGHT: 254.94 LBS | TEMPERATURE: 98 F

## 2022-08-04 DIAGNOSIS — E66.09 CLASS 2 OBESITY DUE TO EXCESS CALORIES WITHOUT SERIOUS COMORBIDITY WITH BODY MASS INDEX (BMI) OF 37.0 TO 37.9 IN ADULT: ICD-10-CM

## 2022-08-04 DIAGNOSIS — Z00.00 NORMAL PHYSICAL EXAM: Primary | ICD-10-CM

## 2022-08-04 DIAGNOSIS — E78.2 MIXED HYPERLIPIDEMIA: ICD-10-CM

## 2022-08-04 DIAGNOSIS — Z12.11 SCREENING FOR COLON CANCER: ICD-10-CM

## 2022-08-04 DIAGNOSIS — Z83.2 FAMILY HISTORY OF SICKLE CELL ANEMIA: ICD-10-CM

## 2022-08-04 DIAGNOSIS — Z00.00 NORMAL PHYSICAL EXAM: ICD-10-CM

## 2022-08-04 DIAGNOSIS — Z23 NEED FOR SHINGLES VACCINE: ICD-10-CM

## 2022-08-04 DIAGNOSIS — R73.03 PREDIABETES: ICD-10-CM

## 2022-08-04 LAB
ALBUMIN SERPL BCP-MCNC: 4.2 G/DL (ref 3.5–5.2)
ALP SERPL-CCNC: 55 U/L (ref 55–135)
ALT SERPL W/O P-5'-P-CCNC: 27 U/L (ref 10–44)
ANION GAP SERPL CALC-SCNC: 11 MMOL/L (ref 8–16)
AST SERPL-CCNC: 29 U/L (ref 10–40)
BASOPHILS # BLD AUTO: 0.05 K/UL (ref 0–0.2)
BASOPHILS NFR BLD: 0.9 % (ref 0–1.9)
BILIRUB SERPL-MCNC: 0.8 MG/DL (ref 0.1–1)
BUN SERPL-MCNC: 18 MG/DL (ref 6–20)
CALCIUM SERPL-MCNC: 9.6 MG/DL (ref 8.7–10.5)
CHLORIDE SERPL-SCNC: 104 MMOL/L (ref 95–110)
CHOLEST SERPL-MCNC: 222 MG/DL (ref 120–199)
CHOLEST/HDLC SERPL: 4 {RATIO} (ref 2–5)
CO2 SERPL-SCNC: 24 MMOL/L (ref 23–29)
COMPLEXED PSA SERPL-MCNC: 0.3 NG/ML (ref 0–4)
CREAT SERPL-MCNC: 1.4 MG/DL (ref 0.5–1.4)
DIFFERENTIAL METHOD: NORMAL
EOSINOPHIL # BLD AUTO: 0.1 K/UL (ref 0–0.5)
EOSINOPHIL NFR BLD: 2.5 % (ref 0–8)
ERYTHROCYTE [DISTWIDTH] IN BLOOD BY AUTOMATED COUNT: 14.2 % (ref 11.5–14.5)
EST. GFR  (NO RACE VARIABLE): 58.6 ML/MIN/1.73 M^2
ESTIMATED AVG GLUCOSE: 131 MG/DL (ref 68–131)
GLUCOSE SERPL-MCNC: 88 MG/DL (ref 70–110)
HBA1C MFR BLD: 6.2 % (ref 4–5.6)
HCT VFR BLD AUTO: 45 % (ref 40–54)
HDLC SERPL-MCNC: 55 MG/DL (ref 40–75)
HDLC SERPL: 24.8 % (ref 20–50)
HGB BLD-MCNC: 14.4 G/DL (ref 14–18)
IMM GRANULOCYTES # BLD AUTO: 0.01 K/UL (ref 0–0.04)
IMM GRANULOCYTES NFR BLD AUTO: 0.2 % (ref 0–0.5)
LDLC SERPL CALC-MCNC: 149.2 MG/DL (ref 63–159)
LYMPHOCYTES # BLD AUTO: 2 K/UL (ref 1–4.8)
LYMPHOCYTES NFR BLD: 36.3 % (ref 18–48)
MCH RBC QN AUTO: 28.3 PG (ref 27–31)
MCHC RBC AUTO-ENTMCNC: 32 G/DL (ref 32–36)
MCV RBC AUTO: 88 FL (ref 82–98)
MONOCYTES # BLD AUTO: 0.7 K/UL (ref 0.3–1)
MONOCYTES NFR BLD: 12.3 % (ref 4–15)
NEUTROPHILS # BLD AUTO: 2.7 K/UL (ref 1.8–7.7)
NEUTROPHILS NFR BLD: 47.8 % (ref 38–73)
NONHDLC SERPL-MCNC: 167 MG/DL
NRBC BLD-RTO: 0 /100 WBC
PLATELET # BLD AUTO: 217 K/UL (ref 150–450)
PMV BLD AUTO: 10.4 FL (ref 9.2–12.9)
POTASSIUM SERPL-SCNC: 4.2 MMOL/L (ref 3.5–5.1)
PROT SERPL-MCNC: 8.1 G/DL (ref 6–8.4)
RBC # BLD AUTO: 5.09 M/UL (ref 4.6–6.2)
SODIUM SERPL-SCNC: 139 MMOL/L (ref 136–145)
TRIGL SERPL-MCNC: 89 MG/DL (ref 30–150)
WBC # BLD AUTO: 5.54 K/UL (ref 3.9–12.7)

## 2022-08-04 PROCEDURE — 1159F PR MEDICATION LIST DOCUMENTED IN MEDICAL RECORD: ICD-10-PCS | Mod: CPTII,S$GLB,, | Performed by: INTERNAL MEDICINE

## 2022-08-04 PROCEDURE — 3008F BODY MASS INDEX DOCD: CPT | Mod: CPTII,S$GLB,, | Performed by: INTERNAL MEDICINE

## 2022-08-04 PROCEDURE — 83036 HEMOGLOBIN GLYCOSYLATED A1C: CPT | Performed by: INTERNAL MEDICINE

## 2022-08-04 PROCEDURE — 3079F PR MOST RECENT DIASTOLIC BLOOD PRESSURE 80-89 MM HG: ICD-10-PCS | Mod: CPTII,S$GLB,, | Performed by: INTERNAL MEDICINE

## 2022-08-04 PROCEDURE — 1160F RVW MEDS BY RX/DR IN RCRD: CPT | Mod: CPTII,S$GLB,, | Performed by: INTERNAL MEDICINE

## 2022-08-04 PROCEDURE — 99396 PREV VISIT EST AGE 40-64: CPT | Mod: 25,S$GLB,, | Performed by: INTERNAL MEDICINE

## 2022-08-04 PROCEDURE — 36415 COLL VENOUS BLD VENIPUNCTURE: CPT | Mod: PO | Performed by: INTERNAL MEDICINE

## 2022-08-04 PROCEDURE — 99999 PR PBB SHADOW E&M-EST. PATIENT-LVL IV: ICD-10-PCS | Mod: PBBFAC,,, | Performed by: INTERNAL MEDICINE

## 2022-08-04 PROCEDURE — 90750 ZOSTER RECOMBINANT VACCINE: ICD-10-PCS | Mod: S$GLB,,, | Performed by: INTERNAL MEDICINE

## 2022-08-04 PROCEDURE — 80061 LIPID PANEL: CPT | Performed by: INTERNAL MEDICINE

## 2022-08-04 PROCEDURE — 90471 IMMUNIZATION ADMIN: CPT | Mod: S$GLB,,, | Performed by: INTERNAL MEDICINE

## 2022-08-04 PROCEDURE — 3074F SYST BP LT 130 MM HG: CPT | Mod: CPTII,S$GLB,, | Performed by: INTERNAL MEDICINE

## 2022-08-04 PROCEDURE — 85025 COMPLETE CBC W/AUTO DIFF WBC: CPT | Performed by: INTERNAL MEDICINE

## 2022-08-04 PROCEDURE — 80053 COMPREHEN METABOLIC PANEL: CPT | Performed by: INTERNAL MEDICINE

## 2022-08-04 PROCEDURE — 84153 ASSAY OF PSA TOTAL: CPT | Performed by: INTERNAL MEDICINE

## 2022-08-04 PROCEDURE — 3008F PR BODY MASS INDEX (BMI) DOCUMENTED: ICD-10-PCS | Mod: CPTII,S$GLB,, | Performed by: INTERNAL MEDICINE

## 2022-08-04 PROCEDURE — 99999 PR PBB SHADOW E&M-EST. PATIENT-LVL IV: CPT | Mod: PBBFAC,,, | Performed by: INTERNAL MEDICINE

## 2022-08-04 PROCEDURE — 3079F DIAST BP 80-89 MM HG: CPT | Mod: CPTII,S$GLB,, | Performed by: INTERNAL MEDICINE

## 2022-08-04 PROCEDURE — 1160F PR REVIEW ALL MEDS BY PRESCRIBER/CLIN PHARMACIST DOCUMENTED: ICD-10-PCS | Mod: CPTII,S$GLB,, | Performed by: INTERNAL MEDICINE

## 2022-08-04 PROCEDURE — 99396 PR PREVENTIVE VISIT,EST,40-64: ICD-10-PCS | Mod: 25,S$GLB,, | Performed by: INTERNAL MEDICINE

## 2022-08-04 PROCEDURE — 1159F MED LIST DOCD IN RCRD: CPT | Mod: CPTII,S$GLB,, | Performed by: INTERNAL MEDICINE

## 2022-08-04 PROCEDURE — 90471 ZOSTER RECOMBINANT VACCINE: ICD-10-PCS | Mod: S$GLB,,, | Performed by: INTERNAL MEDICINE

## 2022-08-04 PROCEDURE — 3074F PR MOST RECENT SYSTOLIC BLOOD PRESSURE < 130 MM HG: ICD-10-PCS | Mod: CPTII,S$GLB,, | Performed by: INTERNAL MEDICINE

## 2022-08-04 PROCEDURE — 90750 HZV VACC RECOMBINANT IM: CPT | Mod: S$GLB,,, | Performed by: INTERNAL MEDICINE

## 2022-08-05 NOTE — PROGRESS NOTES
CMP creatinine 1.4, has had variable creatinine in the past from 1.2-1.4.  Not sure if this is CKD verses due to body habitus.  Lipid not ideal.  Non . Previously 170.   A1c 6.2 from previous 6.0  PSA normal  CBC normal    results to patient.  Needs to work on TLC.  Physical exam 1 year

## 2023-03-15 ENCOUNTER — HOSPITAL ENCOUNTER (EMERGENCY)
Facility: HOSPITAL | Age: 58
Discharge: HOME | End: 2023-03-16
Attending: EMERGENCY MEDICINE
Payer: COMMERCIAL

## 2023-03-15 VITALS
DIASTOLIC BLOOD PRESSURE: 92 MMHG | BODY MASS INDEX: 32.78 KG/M2 | HEART RATE: 99 BPM | TEMPERATURE: 98 F | HEIGHT: 72 IN | SYSTOLIC BLOOD PRESSURE: 140 MMHG | WEIGHT: 242 LBS | RESPIRATION RATE: 20 BRPM | OXYGEN SATURATION: 98 %

## 2023-03-15 DIAGNOSIS — K63.89 COLON DISTENTION: ICD-10-CM

## 2023-03-15 DIAGNOSIS — K52.89 STERCORAL COLITIS: Primary | ICD-10-CM

## 2023-03-15 DIAGNOSIS — R10.9 ABDOMINAL PAIN: ICD-10-CM

## 2023-03-15 LAB
ALBUMIN SERPL-MCNC: 4.2 G/DL (ref 3.3–5.5)
ALBUMIN SERPL-MCNC: 4.5 G/DL (ref 3.3–5.5)
ALLENS TEST: ABNORMAL
ALP SERPL-CCNC: 45 U/L (ref 42–141)
ALP SERPL-CCNC: 48 U/L (ref 42–141)
BILIRUB SERPL-MCNC: 0.5 MG/DL (ref 0.2–1.6)
BILIRUB SERPL-MCNC: 0.5 MG/DL (ref 0.2–1.6)
BILIRUBIN, POC UA: NEGATIVE
BLOOD, POC UA: NEGATIVE
BUN SERPL-MCNC: 17 MG/DL (ref 7–22)
CALCIUM SERPL-MCNC: 9.7 MG/DL (ref 8–10.3)
CHLORIDE SERPL-SCNC: 103 MMOL/L (ref 98–108)
CLARITY, POC UA: CLEAR
COLOR, POC UA: YELLOW
CREAT SERPL-MCNC: 1 MG/DL (ref 0.6–1.2)
GLUCOSE SERPL-MCNC: 114 MG/DL (ref 73–118)
GLUCOSE, POC UA: NEGATIVE
HCO3 UR-SCNC: 27 MMOL/L (ref 24–28)
KETONES, POC UA: NEGATIVE
LDH SERPL L TO P-CCNC: 0.79 MMOL/L (ref 0.5–2.2)
LEUKOCYTE EST, POC UA: NEGATIVE
NITRITE, POC UA: NEGATIVE
PCO2 BLDA: 44 MMHG (ref 35–45)
PH SMN: 7.4 [PH] (ref 7.35–7.45)
PH UR STRIP: 5.5 [PH]
PO2 BLDA: 42 MMHG (ref 40–60)
POC ALT (SGPT): 22 U/L (ref 10–47)
POC ALT (SGPT): 28 U/L (ref 10–47)
POC AMYLASE: 37 U/L (ref 14–97)
POC AST (SGOT): 37 U/L (ref 11–38)
POC AST (SGOT): 40 U/L (ref 11–38)
POC B-TYPE NATRIURETIC PEPTIDE: <5 PG/ML (ref 0–100)
POC BE: 2 MMOL/L
POC CARDIAC TROPONIN I: 0 NG/ML (ref 0–0.08)
POC GGT: 18 U/L (ref 5–65)
POC SATURATED O2: 77 % (ref 95–100)
POC TCO2: 25 MMOL/L (ref 18–33)
POC TCO2: 28 MMOL/L (ref 24–29)
POTASSIUM BLD-SCNC: 4.6 MMOL/L (ref 3.6–5.1)
PROTEIN, POC UA: NEGATIVE
PROTEIN, POC: 8.1 G/DL (ref 6.4–8.1)
PROTEIN, POC: 8.6 G/DL (ref 6.4–8.1)
SAMPLE: ABNORMAL
SAMPLE: NORMAL
SITE: ABNORMAL
SODIUM BLD-SCNC: 142 MMOL/L (ref 128–145)
SPECIFIC GRAVITY, POC UA: >=1.03
UROBILINOGEN, POC UA: 0.2 E.U./DL

## 2023-03-15 PROCEDURE — 82803 BLOOD GASES ANY COMBINATION: CPT | Mod: ER

## 2023-03-15 PROCEDURE — 93005 ELECTROCARDIOGRAM TRACING: CPT | Mod: ER

## 2023-03-15 PROCEDURE — 85025 COMPLETE CBC W/AUTO DIFF WBC: CPT | Mod: ER

## 2023-03-15 PROCEDURE — 63600175 PHARM REV CODE 636 W HCPCS: Mod: ER | Performed by: EMERGENCY MEDICINE

## 2023-03-15 PROCEDURE — 99285 EMERGENCY DEPT VISIT HI MDM: CPT | Mod: 25,ER

## 2023-03-15 PROCEDURE — 96374 THER/PROPH/DIAG INJ IV PUSH: CPT | Mod: ER

## 2023-03-15 PROCEDURE — 83880 ASSAY OF NATRIURETIC PEPTIDE: CPT | Mod: ER

## 2023-03-15 PROCEDURE — 93010 ELECTROCARDIOGRAM REPORT: CPT | Mod: ,,, | Performed by: INTERNAL MEDICINE

## 2023-03-15 PROCEDURE — 80053 COMPREHEN METABOLIC PANEL: CPT | Mod: ER

## 2023-03-15 PROCEDURE — 82150 ASSAY OF AMYLASE: CPT | Mod: ER

## 2023-03-15 PROCEDURE — 93010 EKG 12-LEAD: ICD-10-PCS | Mod: ,,, | Performed by: INTERNAL MEDICINE

## 2023-03-15 PROCEDURE — 25500020 PHARM REV CODE 255: Mod: ER | Performed by: EMERGENCY MEDICINE

## 2023-03-15 PROCEDURE — 81003 URINALYSIS AUTO W/O SCOPE: CPT | Mod: ER

## 2023-03-15 PROCEDURE — 84484 ASSAY OF TROPONIN QUANT: CPT | Mod: ER

## 2023-03-15 RX ORDER — ONDANSETRON 2 MG/ML
8 INJECTION INTRAMUSCULAR; INTRAVENOUS
Status: COMPLETED | OUTPATIENT
Start: 2023-03-15 | End: 2023-03-15

## 2023-03-15 RX ADMIN — IOHEXOL 100 ML: 350 INJECTION, SOLUTION INTRAVENOUS at 11:03

## 2023-03-15 RX ADMIN — ONDANSETRON 8 MG: 2 INJECTION INTRAMUSCULAR; INTRAVENOUS at 10:03

## 2023-03-16 PROCEDURE — 25000003 PHARM REV CODE 250: Mod: ER | Performed by: EMERGENCY MEDICINE

## 2023-03-16 RX ORDER — POLYETHYLENE GLYCOL 3350 17 G/17G
17 POWDER, FOR SOLUTION ORAL DAILY
Qty: 119 G | Refills: 0 | Status: SHIPPED | OUTPATIENT
Start: 2023-03-17 | End: 2023-04-04

## 2023-03-16 RX ORDER — CIPROFLOXACIN 500 MG/1
500 TABLET ORAL 2 TIMES DAILY
Qty: 20 TABLET | Refills: 0 | Status: SHIPPED | OUTPATIENT
Start: 2023-03-16 | End: 2023-03-26

## 2023-03-16 RX ORDER — METRONIDAZOLE 500 MG/1
500 TABLET ORAL EVERY 12 HOURS
Qty: 20 TABLET | Refills: 0 | Status: SHIPPED | OUTPATIENT
Start: 2023-03-16 | End: 2023-03-26

## 2023-03-16 RX ORDER — MAG HYDROX/ALUMINUM HYD/SIMETH 200-200-20
30 SUSPENSION, ORAL (FINAL DOSE FORM) ORAL
Status: COMPLETED | OUTPATIENT
Start: 2023-03-16 | End: 2023-03-16

## 2023-03-16 RX ORDER — DICYCLOMINE HYDROCHLORIDE 10 MG/1
10 CAPSULE ORAL
Status: COMPLETED | OUTPATIENT
Start: 2023-03-16 | End: 2023-03-16

## 2023-03-16 RX ORDER — LACTULOSE 10 G/15ML
30 SOLUTION ORAL 3 TIMES DAILY
Qty: 405 ML | Refills: 0 | Status: SHIPPED | OUTPATIENT
Start: 2023-03-16 | End: 2023-03-19

## 2023-03-16 RX ADMIN — DICYCLOMINE HYDROCHLORIDE 10 MG: 10 CAPSULE ORAL at 12:03

## 2023-03-16 RX ADMIN — ALUMINUM HYDROXIDE, MAGNESIUM HYDROXIDE, AND SIMETHICONE 30 ML: 200; 200; 20 SUSPENSION ORAL at 12:03

## 2023-03-16 NOTE — DISCHARGE INSTRUCTIONS
Drink plenty of electrolyte-rich fluids (at least one gallon or more daily).  Limit/avoid caffeine (coffee, tea, coke, Dr SammPepper, Mountain Dew, energy drinks, pre-workout supplements, etc.), dairy and alcohol intake while symptoms persist.

## 2023-03-16 NOTE — ED PROVIDER NOTES
"Encounter Date: 3/15/2023    SCRIBE #1 NOTE: I, Camila Roberts, am scribing for, and in the presence of,  Emery Novoa MD. I have scribed the following portions of the note - Other sections scribed: HPI, ROS, PE.     History     Chief Complaint   Patient presents with    Nausea    Abdominal Pain     PT C/O NAUSEA AND "BUBBLY" STOMACH OFF AND ON FOR A FEW WEEKS AND NOW C/O LOWER ABD PAIN TONIGHT, LAST BM EARLIER AND NML     Terrie Schroeder is a 57 y.o. male, with no pertinent PMHx, who presents to the ED with complaints of intermittent left lower quadrant pain and increased "bubbling noises" coming from his abdomin onset 2 weeks ago. Pt reports his stomach pain started while eating 11 hours ago. Pt further reports associated symptoms including nausea and 2 vomiting episodes on today.  Pt describes his pain as localized suprapubic in the left lower quadrant. Pt reports attempting treatment with Gas-x, Tums, and Pepsto with no relief. Pt denies any pain currently. Pt denies tobacco use. Pt endorses EtOH consumption weekly. Pt endorses drinking coke-zeros. Pt reports his last colonoscopy was 3 years ago. Denies back pain, diarrhea, urinary problems, constipation, blood in stool, melena, chest pain, SOB, fever, leg swelling, fatigue.     The history is provided by the patient. No  was used.   Review of patient's allergies indicates:  No Known Allergies  History reviewed. No pertinent past medical history.  Past Surgical History:   Procedure Laterality Date    COLONOSCOPY N/A 11/19/2018    Procedure: COLONOSCOPY;  Surgeon: GEORGES Schneider MD;  Location: [facility] ([address] [place] [address]);  Service: Endoscopy;  Laterality: N/A;    COLONOSCOPY N/A 4/5/2023    Procedure: COLONOSCOPY;  Surgeon: Vivi Butler MD;  Location: [facility];  Service: Endoscopy;  Laterality: N/A;    ESOPHAGOGASTRODUODENOSCOPY N/A 4/5/2023    Procedure: EGD (ESOPHAGOGASTRODUODENOSCOPY);  Surgeon: Vivi Butler MD;  Location: [facility];  " Service: Endoscopy;  Laterality: N/A;  1-4 weeks ASAP with any gi provider, WB, standard prep  instr portal -r/s-tb  4/4/23 - Pt is r/s due to not tolerating prep -per DR. Carrie cintron to schedule during lunch hour (45 min) - ok with miralax prep - [id number]     Family History   Problem Relation Age of Onset    Heart disease Mother     Diabetes Mother     Diabetes Father     No Known Problems Sister     No Known Problems Sister     No Known Problems Sister     No Known Problems Sister     Diabetes Brother     Sickle cell anemia Brother     Sickle cell anemia Brother     No Known Problems Brother     No Known Problems Brother     No Known Problems Daughter     No Known Problems Daughter     No Known Problems Son     Colon cancer Neg Hx     Esophageal cancer Neg Hx      Social History     Tobacco Use    Smoking status: Never    Smokeless tobacco: Never   Substance Use Topics    Alcohol use: Yes     Alcohol/week: 4.0 standard drinks     Types: 4 Cans of beer per week     Comment: Occasionally    Drug use: No     Review of Systems   Constitutional:  Negative for fatigue and fever.   Respiratory:  Negative for shortness of breath.    Cardiovascular:  Negative for chest pain and leg swelling.   Gastrointestinal:  Positive for abdominal pain, nausea and vomiting. Negative for blood in stool, constipation and diarrhea.   Genitourinary:  Negative for dysuria, frequency, hematuria and urgency.   Musculoskeletal:  Negative for back pain.   All other systems reviewed and are negative.    Physical Exam     Initial Vitals [03/15/23 2148]   BP Pulse Resp Temp SpO2   (!) 140/92 99 20 97.8 °F (36.6 °C) 98 %      MAP       --         Physical Exam    Nursing note and vitals reviewed.  Constitutional: He appears well-developed and well-nourished.   HENT:   Head: Normocephalic and atraumatic.   Right Ear: External ear normal.   Left Ear: External ear normal.   Eyes: Conjunctivae are normal.   Neck: Phonation normal. Neck supple.   Normal  range of motion.  Cardiovascular:  Normal rate and intact distal pulses.           Pulmonary/Chest: Effort normal and breath sounds normal. No stridor. No respiratory distress.   Abdominal: He exhibits distension. Bowel sounds are increased. There is no abdominal tenderness.   No right CVA tenderness.  No left CVA tenderness. There is no rebound and no guarding.   Musculoskeletal:         General: Normal range of motion.      Cervical back: Normal range of motion and neck supple.      Right upper leg: No edema.      Left upper leg: No edema.      Right lower leg: No edema.      Left lower leg: No edema.     Neurological: He is alert and oriented to person, place, and time.   Skin: Skin is warm and dry. No rash noted.   Psychiatric: He has a normal mood and affect. His behavior is normal.       ED Course   Procedures  Labs Reviewed   POCT URINALYSIS W/O SCOPE - Abnormal; Notable for the following components:       Result Value    Spec Grav UA >=1.030 (*)     All other components within normal limits   ISTAT PROCEDURE - Abnormal; Notable for the following components:    POC SATURATED O2 77 (*)     All other components within normal limits   POCT LIVER PANEL - Abnormal; Notable for the following components:    AST (SGOT), POC 40 (*)     Protein, POC 8.6 (*)     All other components within normal limits   TROPONIN ISTAT   POCT URINALYSIS W/O SCOPE   POCT CBC   POCT CMP   POCT LIVER PANEL   POCT TROPONIN   POCT B-TYPE NATRIURETIC PEPTIDE (BNP)   POCT CMP   POCT B-TYPE NATRIURETIC PEPTIDE (BNP)     EKG Readings: (Independently Interpreted)   Initial Reading: No STEMI. Rhythm: Normal Sinus Rhythm. Heart Rate: 95. Ectopy: No Ectopy. Conduction: Normal. ST Segments: Normal ST Segments. T Waves: Normal. Axis: Normal. Clinical Impression: Normal Sinus Rhythm   ECG Results              EKG 12-lead (Final result)  Result time 03/16/23 15:21:42      Final result by Interface, [facility] (03/16/23 15:21:42)                    Narrative:    Test Reason : R10.9,    Vent. Rate : 093 BPM     Atrial Rate : 093 BPM     P-R Int : 138 ms          QRS Dur : 082 ms      QT Int : 342 ms       P-R-T Axes : 050 001 028 degrees     QTc Int : 425 ms    Normal sinus rhythm  Normal ECG  No previous ECGs available  Confirmed by Francisco Vasquez MD ([id number]) on 3/16/2023 3:21:33 PM    Referred By: [email or web address]   SELF           Confirmed By:Francisco Vasquez MD                                     EKG 12-LEAD (Final result)  Result time 03/30/23 13:17:30      Final result by Unknown User (03/30/23 13:17:30)                                      Imaging Results              CT Abdomen Pelvis With Contrast (Final result)  Result time 03/15/23 23:53:24      Final result by Harpreet Cooper MD (03/15/23 23:53:24)                   Impression:      Diffuse distention of the large bowel with no identifiable transition point.  Mild thickening of the perirectal fascia.  The findings may represent stercoral colitis.  Suggest clinical correlation.    Additional findings as above.      Electronically signed by: Harpreet Cooper MD  Date:    03/15/2023  Time:    23:53               Narrative:    EXAMINATION:  CT ABDOMEN PELVIS WITH CONTRAST    CLINICAL HISTORY:  LLQ abdominal pain;Bowel obstruction suspected;    TECHNIQUE:  Low dose axial images, sagittal and coronal reformations were obtained from the lung bases to the pubic symphysis following the IV administration of 100 mL of Omnipaque 350 .  Oral contrast was not given.    There are some motion limitations to the examination.    COMPARISON:  Ultrasound dated 09/10/2010.    FINDINGS:  There are no pleural effusions.  There is no evidence of a pneumothorax.  No airspace opacity is present.  No pulmonary nodule is identified.    The heart is unremarkable.  There is normal tapering of the abdominal aorta.  The celiac trunk, the SMA, and NEFTALI are within normal limits.  The portal veins and mesenteric veins are unremarkable.  The IVC and the  "remainder of the venous structures are unremarkable.    There is no evidence of lymphadenopathy in the abdomen or pelvis.    The esophagus, stomach, and duodenum are within normal limits.  The small bowel loops are unremarkable.  The appendix is within normal limits.  There is diffuse dilatation of the large bowel with no transition point identified.  There is some mild thickening of the perirectal fascia.    The liver is unremarkable.  The gallbladder is distended.  No gallstones are present.  The biliary tree is within normal limits.  The spleen is unremarkable.  The pancreas is within normal limits.  The adrenal glands are unremarkable.    The kidneys are unremarkable.  The ureters and urinary bladder are unremarkable.  The prostate gland is within normal limits.    There is no evidence of free fluid in the abdomen or pelvis.  There is no evidence of free air.  There is no evidence pneumatosis.  No portal venous air is identified.    The psoas margins are unremarkable.  The abdominal wall is within normal limits.  There is sclerosis involving the bilateral sacroiliac joints.  There is no evidence of a fracture.                                       Medications   ondansetron injection 8 mg (8 mg Intravenous Given 3/15/23 2229)   iohexoL (OMNIPAQUE 350) injection 100 mL (100 mLs Intravenous Given 3/15/23 2316)   aluminum-magnesium hydroxide-simethicone 200-200-20 mg/5 mL suspension 30 mL (30 mLs Oral Given 3/16/23 0028)   dicyclomine capsule 10 mg (10 mg Oral Given 3/16/23 0028)     Medical Decision Making:   History:   Old Medical Records: I decided to obtain old medical records.  Initial Assessment:   Terrie Schroeder is a 57 y.o. male, with no pertinent PMHx, who presents to the ED with complaints of intermittent left lower quadrant pain and increased "bubbling noises" coming from his abdomin onset 2 weeks ago. Pt reports his stomach pain started while eating 11 hours ago.  Differential Diagnosis: "   Diverticulitis, appendicitis, cholelithiasis, cholecystitis, gastritis, GERD, SBO, colitis, pancreatitis, UTI, pyelonephritis, food poisoning, dehydration, MILA, others.     Clinical Tests:   Lab Tests: Ordered and Reviewed  Radiological Study: Ordered and Reviewed  Medical Tests: Ordered and Reviewed  ED Management:  Imaging results with stercoral colitis due to constipation. Results discussed with patient. Symptomatic treatment and prophylactic antibiotics prescribed.         Scribe Attestation:   Scribe #1: I performed the above scribed service and the documentation accurately describes the services I performed. I attest to the accuracy of the note.                 Labs Reviewed          Admission on 03/15/2023, Discharged on 03/16/2023   Component Date Value Ref Range Status    Glucose, UA 03/15/2023 Negative   Final    Bilirubin, UA 03/15/2023 Negative   Final    Ketones, UA 03/15/2023 Negative   Final    Spec Grav UA 03/15/2023 >=1.030 (>)   Final    Blood, UA 03/15/2023 Negative   Final    PH, UA 03/15/2023 5.5   Final    Protein, UA 03/15/2023 Negative   Final    Urobilinogen, UA 03/15/2023 0.2  E.U./dL Final    Nitrite, UA 03/15/2023 Negative   Final    Leukocytes, UA 03/15/2023 Negative   Final    Color, UA 03/15/2023 Yellow   Final    Clarity, UA 03/15/2023 Clear   Final    POC PH 03/15/2023 7.396  7.35 - 7.45 Final    POC PCO2 03/15/2023 44.0  35 - 45 mmHg Final    POC PO2 03/15/2023 42  40 - 60 mmHg Final    POC HCO3 03/15/2023 27.0  24 - 28 mmol/L Final    POC BE 03/15/2023 2  -2 to 2 mmol/L Final    POC SATURATED O2 03/15/2023 77 (L)  95 - 100 % Final    POC Lactate 03/15/2023 0.79  0.5 - 2.2 mmol/L Final    POC TCO2 03/15/2023 28  24 - 29 mmol/L Final    Sample 03/15/2023 VENOUS   Final    Site 03/15/2023 Other   Final    Allens Test 03/15/2023 N/A   Final    POC Cardiac Troponin I 03/15/2023 0.00  0.00 - 0.08 ng/mL Final    Sample 03/15/2023 unknown   Final    Comment: A single negative troponin is  insufficient to rule out myocardial infarction.  The use of a serial sampling protocol is recommended practice. Correlate results with reference intervals established for methodology used. Point of care and core laboratory   troponin results are not interchangeable.      Albumin, POC 03/15/2023 4.5  3.3 - 5.5 g/dL Final    Alkaline Phosphatase, POC 03/15/2023 48  42 - 141 U/L Final    ALT (SGPT), POC 03/15/2023 22  10 - 47 U/L Final    Amylase, POC 03/15/2023 37  14 - 97 U/L Final    AST (SGOT), POC 03/15/2023 40 (H)  11 - 38 U/L Final    POC GGT 03/15/2023 18  5 - 65 U/L Final    Bilirubin, POC 03/15/2023 0.5  0.2 - 1.6 mg/dL Final    Protein, POC 03/15/2023 8.6 (H)  6.4 - 8.1 g/dL Final    Albumin, POC 03/15/2023 4.2  3.3 - 5.5 g/dL Final    Alkaline Phosphatase, POC 03/15/2023 45  42 - 141 U/L Final    ALT (SGPT), POC 03/15/2023 28  10 - 47 U/L Final    AST (SGOT), POC 03/15/2023 37  11 - 38 U/L Final    POC BUN 03/15/2023 17  7 - 22 mg/dL Final    Calcium, POC 03/15/2023 9.7  8.0 - 10.3 mg/dL Final    POC Chloride 03/15/2023 103  98 - 108 mmol/L Final    POC Creatinine 03/15/2023 1.0  0.6 - 1.2 mg/dL Final    POC Glucose 03/15/2023 114  73 - 118 mg/dL Final    POC Potassium 03/15/2023 4.6  3.6 - 5.1 mmol/L Final    POC Sodium 03/15/2023 142  128 - 145 mmol/L Final    Bilirubin, POC 03/15/2023 0.5  0.2 - 1.6 mg/dL Final    POC TCO2 03/15/2023 25  18 - 33 mmol/L Final    Protein, POC 03/15/2023 8.1  6.4 - 8.1 g/dL Final    POC B-Type Natriuretic Peptide 03/15/2023 <5.0  0.0 - 100.0 pg/mL Final        Imaging Reviewed    Imaging Results              CT Abdomen Pelvis With Contrast (Final result)  Result time 03/15/23 23:53:24      Final result by Harpreet Cooper MD (03/15/23 23:53:24)                   Impression:      Diffuse distention of the large bowel with no identifiable transition point.  Mild thickening of the perirectal fascia.  The findings may represent stercoral colitis.  Suggest clinical  correlation.    Additional findings as above.      Electronically signed by: Harpreet Cooper MD  Date:    03/15/2023  Time:    23:53               Narrative:    EXAMINATION:  CT ABDOMEN PELVIS WITH CONTRAST    CLINICAL HISTORY:  LLQ abdominal pain;Bowel obstruction suspected;    TECHNIQUE:  Low dose axial images, sagittal and coronal reformations were obtained from the lung bases to the pubic symphysis following the IV administration of 100 mL of Omnipaque 350 .  Oral contrast was not given.    There are some motion limitations to the examination.    COMPARISON:  Ultrasound dated 09/10/2010.    FINDINGS:  There are no pleural effusions.  There is no evidence of a pneumothorax.  No airspace opacity is present.  No pulmonary nodule is identified.    The heart is unremarkable.  There is normal tapering of the abdominal aorta.  The celiac trunk, the SMA, and NEFTALI are within normal limits.  The portal veins and mesenteric veins are unremarkable.  The IVC and the remainder of the venous structures are unremarkable.    There is no evidence of lymphadenopathy in the abdomen or pelvis.    The esophagus, stomach, and duodenum are within normal limits.  The small bowel loops are unremarkable.  The appendix is within normal limits.  There is diffuse dilatation of the large bowel with no transition point identified.  There is some mild thickening of the perirectal fascia.    The liver is unremarkable.  The gallbladder is distended.  No gallstones are present.  The biliary tree is within normal limits.  The spleen is unremarkable.  The pancreas is within normal limits.  The adrenal glands are unremarkable.    The kidneys are unremarkable.  The ureters and urinary bladder are unremarkable.  The prostate gland is within normal limits.    There is no evidence of free fluid in the abdomen or pelvis.  There is no evidence of free air.  There is no evidence pneumatosis.  No portal venous air is identified.    The psoas margins are  unremarkable.  The abdominal wall is within normal limits.  There is sclerosis involving the bilateral sacroiliac joints.  There is no evidence of a fracture.                                      Medications given in ED    Medications   ondansetron injection 8 mg (8 mg Intravenous Given 3/15/23 2229)   iohexoL (OMNIPAQUE 350) injection 100 mL (100 mLs Intravenous Given 3/15/23 2316)   aluminum-magnesium hydroxide-simethicone 200-200-20 mg/5 mL suspension 30 mL (30 mLs Oral Given 3/16/23 002)   dicyclomine capsule 10 mg (10 mg Oral Given 3/16/23 0028)         Note was created using voice recognition software. Note may have occasional typographical errors that may not have been identified and edited despite good aure initial review prior to signing.    I, Emery Novoa MD, personally performed the services described in this documentation. All medical record entries made by the scribe were at my direction and in my presence.  I have reviewed the chart and agree that the record reflects my personal performance and is accurate and complete.       Clinical Impression:   Final diagnoses:  [R10.9] Abdominal pain  [K52.89] Stercoral colitis (Primary)  [K63.89] Colon distention        ED Disposition Condition    Discharge Stable          ED Prescriptions       Medication Sig Dispense Start Date End Date Auth. Provider    lactulose (CHRONULAC) 20 gram/30 mL Soln () Take 45 mLs (30 g total) by mouth 3 (three) times daily. (Alternative dosing 45 ml every hour until two large BMs achieved in a single day) for 3 days 405 mL 3/16/2023 3/19/2023 Emery Novoa MD    metroNIDAZOLE (FLAGYL) 500 MG tablet () Take 1 tablet (500 mg total) by mouth every 12 (twelve) hours. DO NOT DRINK ALCOHOL WHILE TAKING THIS MEDICATION for 10 days 20 tablet 3/16/2023 3/26/2023 Emery Novoa MD    ciprofloxacin HCl (CIPRO) 500 MG tablet () Take 1 tablet (500 mg total) by mouth 2 (two) times daily. for 10 days 20 tablet 3/16/2023  3/26/2023 Emery Novoa MD    polyethylene glycol (GLYCOLAX) 17 gram/dose powder () Take 17 g by mouth once daily. Take daily to prevent constipation 119 g 3/17/2023 2023 Emery Novoa MD          Follow-up Information       Follow up With Specialties Details Why Contact Info Additional Information    Inderjit Loco MD Internal Medicine Call  As needed, for ongoing care [address] [facility] [address] [phone]       The nearest emergency department.  Go to  As needed, If symptoms worsen      [facility] [place], [place] [facility] Gastroenterology Call in 1 day to schedule an appointment, for re-evaluation of today's complaint [address]  [facility] [address] [phone] Please park in garage or surface lot and use Medical Office Bldg entrance. Check in at [address]             Emery Novoa MD  23 0901

## 2023-03-20 ENCOUNTER — TELEPHONE (OUTPATIENT)
Dept: GASTROENTEROLOGY | Facility: CLINIC | Age: 58
End: 2023-03-20
Payer: COMMERCIAL

## 2023-03-20 NOTE — TELEPHONE ENCOUNTER
----- Message from Belgica Sow sent at 3/20/2023  9:22 AM CDT -----  Regarding: [id number]  Type:  Sooner Appointment Request    Patient is requesting a sooner appointment.  Patient declined first available appointment listed as well as another facility and provider .  Patient will not accept being placed on the waitlist and is requesting a message be sent to doctor.    Name of Caller: wife -Shareta    When is the first available appointment? 3/23    Symptoms: colitis     Would the patient rather a call back or a response via [employer]? Call back    Best Call Back Number: [phone]    Additional Information:  pt wife states she will like a call to schedule pt a sooner appt.

## 2023-03-20 NOTE — TELEPHONE ENCOUNTER
Patient was contacted.  Patient is aware that patient has an appointment scheduled for 03/23 @ 9:00.  Patient will keep that appointment.

## 2023-03-23 ENCOUNTER — OFFICE VISIT (OUTPATIENT)
Dept: GASTROENTEROLOGY | Facility: CLINIC | Age: 58
End: 2023-03-23
Payer: COMMERCIAL

## 2023-03-23 VITALS
DIASTOLIC BLOOD PRESSURE: 88 MMHG | BODY MASS INDEX: 33.14 KG/M2 | SYSTOLIC BLOOD PRESSURE: 128 MMHG | HEIGHT: 72 IN | WEIGHT: 244.69 LBS | HEART RATE: 103 BPM

## 2023-03-23 DIAGNOSIS — K63.89 COLON DISTENTION: ICD-10-CM

## 2023-03-23 DIAGNOSIS — K52.89 STERCORAL COLITIS: ICD-10-CM

## 2023-03-23 PROCEDURE — 3008F BODY MASS INDEX DOCD: CPT | Mod: CPTII,S$GLB,, | Performed by: INTERNAL MEDICINE

## 2023-03-23 PROCEDURE — 99999 PR PBB SHADOW E&M-EST. PATIENT-LVL IV: ICD-10-PCS | Mod: PBBFAC,,, | Performed by: INTERNAL MEDICINE

## 2023-03-23 PROCEDURE — 3074F SYST BP LT 130 MM HG: CPT | Mod: CPTII,S$GLB,, | Performed by: INTERNAL MEDICINE

## 2023-03-23 PROCEDURE — 3008F PR BODY MASS INDEX (BMI) DOCUMENTED: ICD-10-PCS | Mod: CPTII,S$GLB,, | Performed by: INTERNAL MEDICINE

## 2023-03-23 PROCEDURE — 3079F PR MOST RECENT DIASTOLIC BLOOD PRESSURE 80-89 MM HG: ICD-10-PCS | Mod: CPTII,S$GLB,, | Performed by: INTERNAL MEDICINE

## 2023-03-23 PROCEDURE — 3079F DIAST BP 80-89 MM HG: CPT | Mod: CPTII,S$GLB,, | Performed by: INTERNAL MEDICINE

## 2023-03-23 PROCEDURE — 1159F PR MEDICATION LIST DOCUMENTED IN MEDICAL RECORD: ICD-10-PCS | Mod: CPTII,S$GLB,, | Performed by: INTERNAL MEDICINE

## 2023-03-23 PROCEDURE — 1159F MED LIST DOCD IN RCRD: CPT | Mod: CPTII,S$GLB,, | Performed by: INTERNAL MEDICINE

## 2023-03-23 PROCEDURE — 99999 PR PBB SHADOW E&M-EST. PATIENT-LVL IV: CPT | Mod: PBBFAC,,, | Performed by: INTERNAL MEDICINE

## 2023-03-23 PROCEDURE — 99204 PR OFFICE/OUTPT VISIT, NEW, LEVL IV, 45-59 MIN: ICD-10-PCS | Mod: S$GLB,,, | Performed by: INTERNAL MEDICINE

## 2023-03-23 PROCEDURE — 1160F RVW MEDS BY RX/DR IN RCRD: CPT | Mod: CPTII,S$GLB,, | Performed by: INTERNAL MEDICINE

## 2023-03-23 PROCEDURE — 3074F PR MOST RECENT SYSTOLIC BLOOD PRESSURE < 130 MM HG: ICD-10-PCS | Mod: CPTII,S$GLB,, | Performed by: INTERNAL MEDICINE

## 2023-03-23 PROCEDURE — 99204 OFFICE O/P NEW MOD 45 MIN: CPT | Mod: S$GLB,,, | Performed by: INTERNAL MEDICINE

## 2023-03-23 PROCEDURE — 1160F PR REVIEW ALL MEDS BY PRESCRIBER/CLIN PHARMACIST DOCUMENTED: ICD-10-PCS | Mod: CPTII,S$GLB,, | Performed by: INTERNAL MEDICINE

## 2023-03-23 NOTE — PROGRESS NOTES
[facility] Note    CC: abdominal pain    HPI 57 y.o. male who presents with 2 weeks of new onset, significant generalized abdominal pain with associated abdominal bloating, a few episodes of nausea, 8lb weight loss and decreased appetite.  Prior to symptom onset he ate chinese food.  His wife had brief diarrhea after eating the same food but overall feels well.    He presented to the ED for this.  CT abdomen showed diffuse colonic dilation with thickening in the rectum.  He was treated with antibiotics for possible stercoral colitis and given lactulose as well.    The patient's abdominal pain has resolved and he briefly had symptom improvement but overall currently feels unwell.  He is not passing as much stool as he usually does.    No family history of colon cancer.    Past Medical History  History reviewed. No pertinent past medical history.      Review of Systems  General ROS: negative for chills, fever.  Positive for weight loss  Cardiovascular ROS: no chest pain or dyspnea on exertion  Gastrointestinal ROS: positive for abdominal distension, decreased appetite and changes in his bowel patterns    Physical Examination  /88   Pulse 103   Ht 6' (1.829 m)   Wt 111 kg (244 lb 11.4 oz)   BMI 33.19 kg/m²   General appearance: alert, cooperative, no distress  HENT: Normocephalic, atraumatic, neck symmetrical, no nasal discharge   Lungs: clear to auscultation bilaterally, no dullness to percussion bilaterally  Heart: regular rate and rhythm without rub; no displacement of the PMI   Abdomen: soft, non-tender; bowel sounds normoactive; no organomegaly  Extremities: extremities symmetric; no clubbing, cyanosis, or edema  Neurologic: Alert and oriented X 3, normal strength, normal coordination and gait    Labs:  Lab Results   Component Value Date    WBC 5.54 08/04/2022    HGB 14.4 08/04/2022    HCT 45.0 08/04/2022    MCV 88 08/04/2022     08/04/2022         CMP  Sodium   Date Value Ref Range  Status   08/04/2022 139 136 - 145 mmol/L Final     Potassium   Date Value Ref Range Status   08/04/2022 4.2 3.5 - 5.1 mmol/L Final     Chloride   Date Value Ref Range Status   08/04/2022 104 95 - 110 mmol/L Final     CO2   Date Value Ref Range Status   08/04/2022 24 23 - 29 mmol/L Final     Glucose   Date Value Ref Range Status   08/04/2022 88 70 - 110 mg/dL Final     BUN   Date Value Ref Range Status   08/04/2022 18 6 - 20 mg/dL Final     Creatinine   Date Value Ref Range Status   08/04/2022 1.4 0.5 - 1.4 mg/dL Final     Calcium   Date Value Ref Range Status   08/04/2022 9.6 8.7 - 10.5 mg/dL Final     Total Protein   Date Value Ref Range Status   08/04/2022 8.1 6.0 - 8.4 g/dL Final     Albumin   Date Value Ref Range Status   08/04/2022 4.2 3.5 - 5.2 g/dL Final     Total Bilirubin   Date Value Ref Range Status   08/04/2022 0.8 0.1 - 1.0 mg/dL Final     Comment:     For infants and newborns, interpretation of results should be based  on gestational age, weight and in agreement with clinical  observations.    Premature Infant recommended reference ranges:  Up to 24 hours.............<8.0 mg/dL  Up to 48 hours............<12.0 mg/dL  3-5 days..................<15.0 mg/dL  6-29 days.................<15.0 mg/dL       Alkaline Phosphatase   Date Value Ref Range Status   08/04/2022 55 55 - 135 U/L Final     AST   Date Value Ref Range Status   08/04/2022 29 10 - 40 U/L Final     ALT   Date Value Ref Range Status   08/04/2022 27 10 - 44 U/L Final     Anion Gap   Date Value Ref Range Status   08/04/2022 11 8 - 16 mmol/L Final     eGFR   Date Value Ref Range Status   08/04/2022 58.6 (A) >60 mL/min/1.73 m^2 Final         Assessment:   The patient is a 58 yo man with new onset abdominal pain, distension, intermittent vomiting, weight loss and change in bowel patterns.  CT abdomen shows colonic dilation and possible rectal thickening.    Plan:  -Schedule urgent EGD and colonoscopy to further evaluate his symptoms.  Message sent  to the schedulers.    Vivi Butler MD

## 2023-03-24 ENCOUNTER — TELEPHONE (OUTPATIENT)
Dept: ENDOSCOPY | Facility: HOSPITAL | Age: 58
End: 2023-03-24
Payer: COMMERCIAL

## 2023-03-24 VITALS — BODY MASS INDEX: 32.51 KG/M2 | WEIGHT: 240 LBS | HEIGHT: 72 IN

## 2023-03-24 DIAGNOSIS — R11.2 NAUSEA AND VOMITING, UNSPECIFIED VOMITING TYPE: ICD-10-CM

## 2023-03-24 DIAGNOSIS — Z12.11 SPECIAL SCREENING FOR MALIGNANT NEOPLASMS, COLON: Primary | ICD-10-CM

## 2023-03-24 DIAGNOSIS — R63.4 WEIGHT LOSS: ICD-10-CM

## 2023-03-24 RX ORDER — POLYETHYLENE GLYCOL 3350, SODIUM SULFATE ANHYDROUS, SODIUM BICARBONATE, SODIUM CHLORIDE, POTASSIUM CHLORIDE 236; 22.74; 6.74; 5.86; 2.97 G/4L; G/4L; G/4L; G/4L; G/4L
4 POWDER, FOR SOLUTION ORAL ONCE
Qty: 4000 ML | Refills: 0 | Status: SHIPPED | OUTPATIENT
Start: 2023-03-24 | End: 2023-03-24

## 2023-03-24 NOTE — TELEPHONE ENCOUNTER
"----- Message from Vivi Butler MD sent at 3/23/2023  9:24 AM CDT -----  Regarding: EGD and colonoscopy  Procedure: EGD/Colonoscopy    Diagnosis: Abnormal Imaging, Weight loss, and Nausea/Vomiting    Procedure Timin-4 weeks, as soon as possible    #If within 4 weeks selected, please diogenes as high priority#    #If greater than 12 weeks, please select "4-12 weeks" and delay sending until 2 months prior to requested date#     Provider: Any GI provider    Location: [facility]    Additional Scheduling Information: No scheduling concerns    Prep Specifications:Standard prep    Have you attached a patient to this message: yes      "

## 2023-03-24 NOTE — PLAN OF CARE
Called and spoke to patient to schedule for an EGD/colonoscopy. After reviewing medical history and prep instructions, patient states he will have to call back later to schedule to pick a date. Phone number provided.

## 2023-03-24 NOTE — PLAN OF CARE
Returned patient's call and spoke with Ms. Mcgill, patient's wife.  EGD/Colonoscopy scheduled. 4/4/23 with an arrival time of 12:30 PM confirmed.  Instructions reviewed and verbalized. Instructions sent via portal.  Patient verbalized an understanding.

## 2023-03-27 ENCOUNTER — TELEPHONE (OUTPATIENT)
Dept: ENDOSCOPY | Facility: HOSPITAL | Age: 58
End: 2023-03-27
Payer: COMMERCIAL

## 2023-03-28 ENCOUNTER — HOSPITAL ENCOUNTER (EMERGENCY)
Facility: HOSPITAL | Age: 58
Discharge: HOME | End: 2023-03-28
Attending: EMERGENCY MEDICINE
Payer: COMMERCIAL

## 2023-03-28 VITALS
HEIGHT: 72 IN | OXYGEN SATURATION: 96 % | SYSTOLIC BLOOD PRESSURE: 148 MMHG | RESPIRATION RATE: 18 BRPM | HEART RATE: 101 BPM | WEIGHT: 239 LBS | BODY MASS INDEX: 32.37 KG/M2 | TEMPERATURE: 98 F | DIASTOLIC BLOOD PRESSURE: 93 MMHG

## 2023-03-28 DIAGNOSIS — K63.9 COLONIC THICKENING: Primary | ICD-10-CM

## 2023-03-28 DIAGNOSIS — R93.5 ABNORMAL CT OF THE ABDOMEN: ICD-10-CM

## 2023-03-28 LAB
ALBUMIN SERPL-MCNC: 3.9 G/DL (ref 3.3–5.5)
ALBUMIN SERPL-MCNC: 4.2 G/DL (ref 3.3–5.5)
ALP SERPL-CCNC: 45 U/L (ref 42–141)
ALP SERPL-CCNC: 50 U/L (ref 42–141)
BILIRUB SERPL-MCNC: 0.8 MG/DL (ref 0.2–1.6)
BILIRUB SERPL-MCNC: 0.8 MG/DL (ref 0.2–1.6)
BILIRUBIN, POC UA: NEGATIVE
BLOOD, POC UA: ABNORMAL
BUN SERPL-MCNC: 10 MG/DL (ref 7–22)
CALCIUM SERPL-MCNC: 9.9 MG/DL (ref 8–10.3)
CHLORIDE SERPL-SCNC: 96 MMOL/L (ref 98–108)
CLARITY, POC UA: CLEAR
COLOR, POC UA: YELLOW
CREAT SERPL-MCNC: 1.1 MG/DL (ref 0.6–1.2)
GLUCOSE SERPL-MCNC: 120 MG/DL (ref 73–118)
GLUCOSE, POC UA: NEGATIVE
KETONES, POC UA: NEGATIVE
LEUKOCYTE EST, POC UA: NEGATIVE
NITRITE, POC UA: NEGATIVE
PH UR STRIP: 6.5 [PH]
POC ALT (SGPT): 23 U/L (ref 10–47)
POC ALT (SGPT): 24 U/L (ref 10–47)
POC AMYLASE: 26 U/L (ref 14–97)
POC AST (SGOT): 29 U/L (ref 11–38)
POC AST (SGOT): 30 U/L (ref 11–38)
POC GGT: 19 U/L (ref 5–65)
POC TCO2: 31 MMOL/L (ref 18–33)
POTASSIUM BLD-SCNC: 3.4 MMOL/L (ref 3.6–5.1)
PROTEIN, POC UA: NEGATIVE
PROTEIN, POC: 7.9 G/DL (ref 6.4–8.1)
PROTEIN, POC: 8 G/DL (ref 6.4–8.1)
SODIUM BLD-SCNC: 139 MMOL/L (ref 128–145)
SPECIFIC GRAVITY, POC UA: 1.02
UROBILINOGEN, POC UA: 0.2 E.U./DL

## 2023-03-28 PROCEDURE — 81003 URINALYSIS AUTO W/O SCOPE: CPT | Mod: ER

## 2023-03-28 PROCEDURE — 63600175 PHARM REV CODE 636 W HCPCS: Mod: ER | Performed by: NURSE PRACTITIONER

## 2023-03-28 PROCEDURE — 80053 COMPREHEN METABOLIC PANEL: CPT | Mod: ER

## 2023-03-28 PROCEDURE — 82150 ASSAY OF AMYLASE: CPT | Mod: ER

## 2023-03-28 PROCEDURE — 25500020 PHARM REV CODE 255: Mod: ER | Performed by: NURSE PRACTITIONER

## 2023-03-28 PROCEDURE — 96372 THER/PROPH/DIAG INJ SC/IM: CPT | Performed by: NURSE PRACTITIONER

## 2023-03-28 PROCEDURE — 99285 EMERGENCY DEPT VISIT HI MDM: CPT | Mod: 25,ER

## 2023-03-28 PROCEDURE — 96374 THER/PROPH/DIAG INJ IV PUSH: CPT | Mod: ER

## 2023-03-28 PROCEDURE — 96361 HYDRATE IV INFUSION ADD-ON: CPT | Mod: ER

## 2023-03-28 PROCEDURE — 25000003 PHARM REV CODE 250: Mod: ER | Performed by: NURSE PRACTITIONER

## 2023-03-28 PROCEDURE — 85025 COMPLETE CBC W/AUTO DIFF WBC: CPT | Mod: ER

## 2023-03-28 RX ORDER — ONDANSETRON 4 MG/1
4 TABLET, ORALLY DISINTEGRATING ORAL EVERY 6 HOURS PRN
Qty: 20 TABLET | Refills: 0 | Status: SHIPPED | OUTPATIENT
Start: 2023-03-28

## 2023-03-28 RX ORDER — DICYCLOMINE HYDROCHLORIDE 10 MG/ML
20 INJECTION INTRAMUSCULAR
Status: COMPLETED | OUTPATIENT
Start: 2023-03-28 | End: 2023-03-28

## 2023-03-28 RX ORDER — DICYCLOMINE HYDROCHLORIDE 20 MG/1
20 TABLET ORAL 2 TIMES DAILY
Qty: 20 TABLET | Refills: 0 | Status: SHIPPED | OUTPATIENT
Start: 2023-03-28 | End: 2023-04-04

## 2023-03-28 RX ORDER — ONDANSETRON 2 MG/ML
8 INJECTION INTRAMUSCULAR; INTRAVENOUS
Status: COMPLETED | OUTPATIENT
Start: 2023-03-28 | End: 2023-03-28

## 2023-03-28 RX ADMIN — IOHEXOL 100 ML: 350 INJECTION, SOLUTION INTRAVENOUS at 11:03

## 2023-03-28 RX ADMIN — SODIUM CHLORIDE 1000 ML: 9 INJECTION, SOLUTION INTRAVENOUS at 11:03

## 2023-03-28 RX ADMIN — DICYCLOMINE HYDROCHLORIDE 20 MG: 20 INJECTION INTRAMUSCULAR at 02:03

## 2023-03-28 RX ADMIN — ONDANSETRON HYDROCHLORIDE 8 MG: 2 SOLUTION INTRAMUSCULAR; INTRAVENOUS at 11:03

## 2023-03-28 NOTE — DISCHARGE INSTRUCTIONS
Follow up with Dr. Butler for colonoscopy as planned.    Return to the emergency department immediately for any new or worsening symptoms.    Thank you for coming to our Emergency Department today. It is important to remember that some problems are difficult to diagnose and may not be found during your first visit. Be sure to follow up with your primary care doctor.  If you do not have one, you may contact the one listed on your discharge paperwork or you may also call the [facility] Appointment Desk at [phone] to schedule an appointment with one.     Return to the ER with any questions/concerns, new/concerning symptoms, worsening or failure to improve. Do not drive or make any important decisions for 24 hours if you have received any pain medications, sedatives or mood altering drugs during your ER visit.

## 2023-03-28 NOTE — ED PROVIDER NOTES
Encounter Date: 3/28/2023    SCRIBE #1 NOTE: ISHAQUILLE am scribing for, and in the presence of,  Alexis Mills NP. I have scribed the following portions of the note - Other sections scribed: HPI, ROS.     History     Chief Complaint   Patient presents with    Abdominal Pain    Vomiting     Terrie Schroeder, a 57 y.o. male presents to the ED via PV with CC of abdominal pain, bloating,  diarrhea and vomiting for a couple of weeks. Pt states he was seen by a GI doctor and was told he was constipated and was given RXs and was told to come to the ER if he continues to have problems. Pt denies CP/SOB         Terrie Schroeder is a 57 y.o. male, with a PMHx of HLD, who presents to the ED with chronic abdominal pain onset a few months ago. Associated symptoms include vomiting, decreased appetite, nausea, and frequent hiccups. Pt visited ED earlier this month for similar symptoms and was diagnosed with colitis. Pt was given Abx which provided some relief. Pt is scheduled for an endoscopy next week. No other exacerbating or alleviating factors. Denies chest pain, SOB, or other associated symptoms.    The history is provided by the patient. No  was used.   Review of patient's allergies indicates:  No Known Allergies  No past medical history on file.  Past Surgical History:   Procedure Laterality Date    COLONOSCOPY N/A 11/19/2018    Procedure: COLONOSCOPY;  Surgeon: GEORGES Schneider MD;  Location: [address]);  Service: Endoscopy;  Laterality: N/A;     Family History   Problem Relation Age of Onset    Heart disease Mother     Diabetes Mother     Diabetes Father     No Known Problems Sister     No Known Problems Sister     No Known Problems Sister     No Known Problems Sister     Diabetes Brother     Sickle cell anemia Brother     Sickle cell anemia Brother     No Known Problems Brother     No Known Problems Brother     No Known Problems Daughter     No Known Problems Daughter     No Known Problems  Son     Colon cancer Neg Hx     Esophageal cancer Neg Hx      Social History     Tobacco Use    Smoking status: Never    Smokeless tobacco: Never   Substance Use Topics    Alcohol use: Yes     Alcohol/week: 4.0 standard drinks     Types: 4 Cans of beer per week     Comment: Occasionally    Drug use: No     Review of Systems   Constitutional:  Positive for appetite change (decerased). Negative for chills, diaphoresis, fatigue and fever.   HENT:  Negative for congestion, ear discharge, ear pain, postnasal drip, rhinorrhea, sinus pressure, sneezing, sore throat and voice change.    Eyes:  Negative for discharge, itching and visual disturbance.   Respiratory:  Negative for cough, shortness of breath and wheezing.    Cardiovascular:  Negative for chest pain, palpitations and leg swelling.   Gastrointestinal:  Positive for abdominal pain, nausea and vomiting.        (+) hiccups   Endocrine: Negative for polydipsia, polyphagia and polyuria.   Genitourinary:  Negative for difficulty urinating, dysuria, frequency, hematuria, penile discharge, penile pain, penile swelling and urgency.   Musculoskeletal:  Negative for arthralgias and myalgias.   Skin:  Negative for rash and wound.   Neurological:  Negative for dizziness, seizures, syncope and weakness.   Hematological:  Negative for adenopathy. Does not bruise/bleed easily.   Psychiatric/Behavioral:  Negative for agitation and self-injury. The patient is not nervous/anxious.      Physical Exam     Initial Vitals   BP Pulse Resp Temp SpO2   03/28/23 0930 03/28/23 0930 03/28/23 0930 03/28/23 0931 03/28/23 0930   (!) 142/88 107 18 98.2 °F (36.8 °C) 98 %      MAP       --                Physical Exam    Nursing note and vitals reviewed.  Constitutional: He appears well-developed and well-nourished. He is not diaphoretic. No distress.   HENT:   Head: Normocephalic and atraumatic.   Right Ear: External ear normal.   Left Ear: External ear normal.   Nose: Nose normal.   Eyes: EOM are  normal. Right eye exhibits no discharge. Left eye exhibits no discharge.   Neck: Neck supple. No tracheal deviation present.   Normal range of motion.  Cardiovascular:  Normal rate.           Pulmonary/Chest: No stridor. No respiratory distress.   Abdominal: Abdomen is soft. He exhibits no distension. There is no abdominal tenderness.   Benign abdominal exam.  No tenderness to palpation   Musculoskeletal:         General: No tenderness. Normal range of motion.      Cervical back: Normal range of motion and neck supple.     Neurological: He is alert and oriented to person, place, and time. He has normal strength. No cranial nerve deficit.   Skin: Skin is warm and dry.   Psychiatric: He has a normal mood and affect. His behavior is normal. Judgment and thought content normal.       ED Course   Procedures  Labs Reviewed   POCT URINALYSIS W/O SCOPE - Abnormal; Notable for the following components:       Result Value    Blood, UA Trace-intact (*)     All other components within normal limits   POCT CMP - Abnormal; Notable for the following components:    POC Chloride 96 (*)     POC Glucose 120 (*)     POC Potassium 3.4 (*)     All other components within normal limits   POCT CBC   POCT URINALYSIS W/O SCOPE   POCT CMP   POCT LIVER PANEL   POCT LIVER PANEL          Imaging Results               CT Abdomen Pelvis With Contrast (Final result)  Result time 03/28/23 12:54:55      Final result by Zachary Sue MD (03/28/23 12:54:55)                   Impression:      1. Apparent irregular concentric mural thickening along the distal descending/proximal sigmoid colon region associated with abnormal mural enhancement. Similar but less pronounced findings are noted along the distal sigmoid colon with findings associated with persistent diffuse distension of the colon greater than small bowel with air and fluid that is slightly progressed when compared to prior.  Findings should be considered potential colonic malignancy until  proven otherwise.  2. Potential subtle 1.0-cm a padded dome hypodensity that is ill-defined, too small to accurately characterize and poorly evaluated on current single phase enhanced imaging however, given the above described findings, potential metastatic disease cannot be excluded.  This report was flagged in Epic as abnormal.      Electronically signed by: Zachary Sue  Date:    03/28/2023  Time:    12:54               Narrative:    EXAMINATION:  CT ABDOMEN PELVIS WITH CONTRAST    CLINICAL HISTORY:  Nausea/vomiting;Abdominal pain, acute, nonlocalized;    TECHNIQUE:  Low dose axial images, sagittal and coronal reformations were obtained from the lung bases to the pubic symphysis following the IV administration of 100 mL of Omnipaque 350.  Oral contrast was not administered, limiting evaluation of hollow viscus organs.    COMPARISON:  CT abdomen/pelvis 03/15/2023    FINDINGS:  Abdomen:    - Lower thorax:Heart is not enlarged.  No significant pericardial thickening in the field of view.    - Lung bases: Bibasilar thin curvilinear airspace opacities suggestive of subsegmental atelectasis.    - Liver: Liver is normal in size, attenuations and morphology without appreciable surface nodularity. Potential subtle, 1.0-cm hepatic dome hypodensity that is ill-defined, too small to accurately characterize and poorly evaluated on current single phase enhanced imaging, not definitively noted on prior exam.  No intrahepatic/extrahepatic biliary dilatation.    - Gallbladder: No radiodense gallstones, mural thickening, pericholecystic fluid or pericholecystic fat stranding.    - Pancreas: Pancreas is normal in size and attenuation without the surrounding inflammatory fat stranding, suspicious mass or fluid/fluid collection.    - Spleen: Spleen is normal in size, morphology and attenuation without appreciable suspicious lesion.    - Adrenals: Bilateral adrenal glands are normal in size and morphology without appreciable  nodularity.    - Kidneys: Kidneys are normal in size, location, morphology and attenuation. No appreciable suspicious sizable lesion, nephroliths or hydroureteronephrosis bilaterally.  Urinary bladder is well distended and grossly unremarkable without mural thickening or appreciable nodularity.    - Bowel/Mesentery: Apparent irregular concentric mural thickening along the distal descending/proximal sigmoid colon region associated with abnormal mural enhancement.  Similar but less pronounced findings are noted along the distal sigmoid colon with findings associated with persistent diffuse distension of the colon greater than small bowel with air and fluid.  Otherwise, no evidence of significant inflammatory fat stranding, free air or free fluid.  Appendix remains diffusely abnormally distended with fluid, not significantly changed.  Bowel mesentery is grossly unremarkable.    - Retroperitoneum:  Aorta is normal in course and caliber without evidence of aneurysmal degeneration.  No sizable retroperitoneal mass or fluid collection.    Pelvis:    - Reproductive organs: Reproductive organs are within normal limits.  No suspicious sizable pelvic mass, lymphadenopathy or fluid.    - Soft tissues:  Imaged soft tissues are grossly unremarkable.    - Bones:  Multilevel degenerative changes of the imaged spine.  No acute displaced fracture, dislocation or suspicious lytic/blastic lesion.                                       Medications   sodium chloride 0.9% bolus 1,000 mL 1,000 mL (0 mLs Intravenous Stopped 3/28/23 1208)   ondansetron injection 8 mg (8 mg Intravenous Given 3/28/23 1107)   iohexoL (OMNIPAQUE 350) injection 100 mL (100 mLs Intravenous Given 3/28/23 1152)   dicyclomine injection 20 mg (20 mg Intramuscular Given 3/28/23 1432)     Medical Decision Making:   History:   Old Medical Records: I decided to obtain old medical records.  Clinical Tests:   Lab Tests: Ordered and Reviewed  Radiological Study: Ordered and  Reviewed        Scribe Attestation:   Scribe #1: I performed the above scribed service and the documentation accurately describes the services I performed. I attest to the accuracy of the note.            I attest that I was available in the ED at the time of patient visit.   I have reviewed the chart outlined by  Alexis Mills NP and I agree with the plan of care based on the documentation provided.        I, Alexis Mills NP, personally performed the services described in this documentation.  All medical record entries made by the scribe were at my direction and in my presence.  I have reviewed the chart and agree that the record reflects my personal performance and is accurate and complete.  Clinical Impression:   Final diagnoses:  [R93.5] Abnormal CT of the abdomen  [K63.9] Colonic thickening (Primary)        ED Disposition Condition    Discharge Stable          ED Prescriptions       Medication Sig Dispense Start Date End Date Auth. Provider    ondansetron (ZOFRAN-ODT) 4 MG TbDL Take 1 tablet (4 mg total) by mouth every 6 (six) hours as needed (Nausea). 20 tablet 3/28/2023 -- Alexis Mills NP    dicyclomine (BENTYL) 20 mg tablet Take 1 tablet (20 mg total) by mouth 2 (two) times daily. 20 tablet 3/28/2023 -- Alexis Mills NP          Follow-up Information       Follow up With Specialties Details Why Contact Info    Vivi Butler MD Gastroenterology Schedule an appointment as soon as possible for a visit in 1 week For further evaluation [address] [facility] [address]  [phone]      [place] [facility] ED Emergency Medicine Go to  If symptoms worsen, As needed [id number]  [facility] [id number] [phone]             Hansa Cruz DO  03/29/23 [id number]

## 2023-03-31 ENCOUNTER — TELEPHONE (OUTPATIENT)
Dept: ENDOSCOPY | Facility: HOSPITAL | Age: 58
End: 2023-03-31
Payer: COMMERCIAL

## 2023-03-31 NOTE — TELEPHONE ENCOUNTER
Pts wife called, reports pt having loose stool, having colonoscopy on Monday, would this interfere with him having procedure. Informed pts wife this would not interfere with procedure. She verbalized understanding.

## 2023-04-02 ENCOUNTER — ANESTHESIA EVENT (OUTPATIENT)
Dept: ENDOSCOPY | Facility: HOSPITAL | Age: 58
End: 2023-04-02
Payer: COMMERCIAL

## 2023-04-02 RX ORDER — LIDOCAINE HYDROCHLORIDE 10 MG/ML
1 INJECTION, SOLUTION EPIDURAL; INFILTRATION; INTRACAUDAL; PERINEURAL ONCE
Status: CANCELLED | OUTPATIENT
Start: 2023-04-02 | End: 2023-04-02

## 2023-04-03 ENCOUNTER — ANESTHESIA (OUTPATIENT)
Dept: ENDOSCOPY | Facility: HOSPITAL | Age: 58
End: 2023-04-03
Payer: COMMERCIAL

## 2023-04-03 ENCOUNTER — TELEPHONE (OUTPATIENT)
Dept: ENDOSCOPY | Facility: HOSPITAL | Age: 58
End: 2023-04-03
Payer: COMMERCIAL

## 2023-04-03 NOTE — TELEPHONE ENCOUNTER
Pt called in today to report he has done his entire prep, started Sunday 4/2/23 on clear liquids. Took the 4 dulcolax tablets at 12. Did the first half of prep at 6pm. Reports he completed the entire first half within the timeframe he was instructed. He also did the prep this morning as instructed. He reports he has not had a bowel movement since taking prep. He does report his stomach feels bloated. Pt did have loose stool on Friday, and he has not had bowel movement since, he did eat small meals Friday and Saturday until he had to start the clear liquid diet. Informed pt he would need to be rescheduled, attempted to schedule for Tuesday to continue clear liquids, pt said he took off today just for procedure. He asked for me to contact his wife to r/s him since she would be bringing him. Called and spoke to pts wife. She confirmed pt did take prep and do clear liquids as instructed. He has not had bm since Friday. Wife did not want to r/s without talking with . Stated she would call back after speaking to him.

## 2023-04-04 ENCOUNTER — TELEPHONE (OUTPATIENT)
Dept: ENDOSCOPY | Facility: HOSPITAL | Age: 58
End: 2023-04-04
Payer: COMMERCIAL

## 2023-04-04 ENCOUNTER — TELEPHONE (OUTPATIENT)
Dept: GASTROENTEROLOGY | Facility: CLINIC | Age: 58
End: 2023-04-04
Payer: COMMERCIAL

## 2023-04-04 ENCOUNTER — OFFICE VISIT (OUTPATIENT)
Dept: INTERNAL MEDICINE | Facility: CLINIC | Age: 58
End: 2023-04-04
Payer: COMMERCIAL

## 2023-04-04 VITALS
DIASTOLIC BLOOD PRESSURE: 78 MMHG | SYSTOLIC BLOOD PRESSURE: 120 MMHG | HEART RATE: 110 BPM | BODY MASS INDEX: 31.98 KG/M2 | OXYGEN SATURATION: 98 % | WEIGHT: 236.13 LBS | HEIGHT: 72 IN

## 2023-04-04 VITALS — HEIGHT: 72 IN | BODY MASS INDEX: 31.97 KG/M2 | WEIGHT: 236 LBS

## 2023-04-04 DIAGNOSIS — R11.2 NAUSEA AND VOMITING, UNSPECIFIED VOMITING TYPE: ICD-10-CM

## 2023-04-04 DIAGNOSIS — R10.9 ABDOMINAL PAIN, UNSPECIFIED ABDOMINAL LOCATION: Primary | ICD-10-CM

## 2023-04-04 DIAGNOSIS — R63.4 WEIGHT LOSS: ICD-10-CM

## 2023-04-04 PROCEDURE — 99999 PR PBB SHADOW E&M-EST. PATIENT-LVL III: ICD-10-PCS | Mod: PBBFAC,,, | Performed by: INTERNAL MEDICINE

## 2023-04-04 PROCEDURE — 1160F RVW MEDS BY RX/DR IN RCRD: CPT | Mod: CPTII,S$GLB,, | Performed by: INTERNAL MEDICINE

## 2023-04-04 PROCEDURE — 1160F PR REVIEW ALL MEDS BY PRESCRIBER/CLIN PHARMACIST DOCUMENTED: ICD-10-PCS | Mod: CPTII,S$GLB,, | Performed by: INTERNAL MEDICINE

## 2023-04-04 PROCEDURE — 3074F PR MOST RECENT SYSTOLIC BLOOD PRESSURE < 130 MM HG: ICD-10-PCS | Mod: CPTII,S$GLB,, | Performed by: INTERNAL MEDICINE

## 2023-04-04 PROCEDURE — 3008F PR BODY MASS INDEX (BMI) DOCUMENTED: ICD-10-PCS | Mod: CPTII,S$GLB,, | Performed by: INTERNAL MEDICINE

## 2023-04-04 PROCEDURE — 99214 PR OFFICE/OUTPT VISIT, EST, LEVL IV, 30-39 MIN: ICD-10-PCS | Mod: S$GLB,,, | Performed by: INTERNAL MEDICINE

## 2023-04-04 PROCEDURE — 1159F PR MEDICATION LIST DOCUMENTED IN MEDICAL RECORD: ICD-10-PCS | Mod: CPTII,S$GLB,, | Performed by: INTERNAL MEDICINE

## 2023-04-04 PROCEDURE — 99214 OFFICE O/P EST MOD 30 MIN: CPT | Mod: S$GLB,,, | Performed by: INTERNAL MEDICINE

## 2023-04-04 PROCEDURE — 1159F MED LIST DOCD IN RCRD: CPT | Mod: CPTII,S$GLB,, | Performed by: INTERNAL MEDICINE

## 2023-04-04 PROCEDURE — 3008F BODY MASS INDEX DOCD: CPT | Mod: CPTII,S$GLB,, | Performed by: INTERNAL MEDICINE

## 2023-04-04 PROCEDURE — 3074F SYST BP LT 130 MM HG: CPT | Mod: CPTII,S$GLB,, | Performed by: INTERNAL MEDICINE

## 2023-04-04 PROCEDURE — 3078F DIAST BP <80 MM HG: CPT | Mod: CPTII,S$GLB,, | Performed by: INTERNAL MEDICINE

## 2023-04-04 PROCEDURE — 99999 PR PBB SHADOW E&M-EST. PATIENT-LVL III: CPT | Mod: PBBFAC,,, | Performed by: INTERNAL MEDICINE

## 2023-04-04 PROCEDURE — 3078F PR MOST RECENT DIASTOLIC BLOOD PRESSURE < 80 MM HG: ICD-10-PCS | Mod: CPTII,S$GLB,, | Performed by: INTERNAL MEDICINE

## 2023-04-04 NOTE — TELEPHONE ENCOUNTER
[facility] Note    The patient continues to have abdominal pain, nausea and vomiting although it is intermittent.  He has had 2 ED visits that are concerning for colonic obstruction due to a possible mass as his colon is dilated.    I have advised him to present to the ED if his symptoms return. He reports he currently feels well though. If his symptoms return and he presents to the ED then I recommend observation with GI and Surgery consults and repeat abdominal imaging.    The patient was scheduled for an EGD and colonoscopy with me yesterday but cancelled due to his symptoms.  I have rescheduled him for an EGD and colonoscopy with me tomorrow 4/5/23.    Vivi Butler MD

## 2023-04-04 NOTE — PLAN OF CARE
Pt scheduled with Dr. Butler for EGD/colonoscopy on 4/5/23, per DR. Carrie sharma to schedule on this date at 1245. Prep instructions reviewed with Pt and on Pt portal. Reviewed with Pt where to locate prep instructions on Pt portal. Pt and Pt wife verbalized understanding.

## 2023-04-04 NOTE — PROGRESS NOTES
Subjective:       Patient ID: Terrie Schroeder is a 57 y.o. male.    Chief Complaint:   Nausea and Emesis    HPI - here to follow-up 2 ER visits for nausea and vomiting over the last 3 weeks.  The patient is not a great historian, but his wife was on the phone to help.  He saw GI after the 2nd ER visit and was prepping for colonoscopy 2 days ago.  He did not have a good response to the prep, vomiting most of the prep and not having bowel movements.  He has not been taking Zofran for nausea.  He has had 2 CTs of his abdomen; the 2nd showed air-fluid levels, a possible mass, and mildly dilated loops of bowel.  He took dulcolax last night and had 2 BM's this am, followed by vomiting.   He has not reached out to his GI specialist.    Pmh/meds:  Reviewed and reconciled in EPIC with patient during visit today.    Review of Systems   Constitutional:  Positive for fatigue.   HENT:  Negative for congestion.    Respiratory:  Negative for shortness of breath.    Cardiovascular:  Negative for chest pain.   Gastrointestinal:  Positive for abdominal distention, abdominal pain, constipation, nausea and vomiting.   Genitourinary:  Negative for dysuria.   Musculoskeletal:  Negative for arthralgias.   Skin:  Negative for rash.   Neurological:  Negative for headaches.   Psychiatric/Behavioral:  Negative for sleep disturbance.      Objective:      Physical Exam  Constitutional:       General: He is not in acute distress.     Appearance: He is well-developed. He is not diaphoretic.   HENT:      Head: Normocephalic and atraumatic.   Cardiovascular:      Rate and Rhythm: Regular rhythm. Tachycardia present.      Heart sounds: Normal heart sounds. No murmur heard.    No friction rub. No gallop.   Pulmonary:      Effort: No respiratory distress.      Breath sounds: No wheezing or rales.   Chest:      Chest wall: No tenderness.   Abdominal:      General: There is distension.      Palpations: Abdomen is soft.      Tenderness: There is no  rebound.      Comments: Hyperactive bowel sounds.  Tympanitic in right/left upper quadrants   Skin:     General: Skin is warm.      Findings: No erythema.   Neurological:      Mental Status: He is alert and oriented to person, place, and time.   Psychiatric:         Thought Content: Thought content normal.       Assessment:       1. Abdominal pain, unspecified abdominal location    2. Nausea and vomiting, unspecified vomiting type    3. Weight loss        Plan:       Terrie was seen today for nausea and emesis.    Diagnoses and all orders for this visit:    Abdominal pain, unspecified abdominal location - worsening.  He has a gastroenterologist; needs to see her about this.  I advised BRAT diet, use zofran as needed for nausea, and take the bentyl.  To the ER if this persists; may need admission    Nausea and vomiting, unspecified vomiting type - as above    Weight loss - about 10 lbs over the month of March    Rtc prn    DANIS Peñaloza MD MPH  Staff Internist

## 2023-04-05 ENCOUNTER — HOSPITAL ENCOUNTER (OUTPATIENT)
Facility: HOSPITAL | Age: 58
Discharge: HOME | End: 2023-04-05
Attending: INTERNAL MEDICINE | Admitting: INTERNAL MEDICINE
Payer: COMMERCIAL

## 2023-04-05 ENCOUNTER — HOSPITAL ENCOUNTER (EMERGENCY)
Facility: HOSPITAL | Age: 58
Discharge: LEFT BEFORE DISCHARGE | End: 2023-04-05
Attending: STUDENT IN AN ORGANIZED HEALTH CARE EDUCATION/TRAINING PROGRAM
Payer: COMMERCIAL

## 2023-04-05 VITALS
OXYGEN SATURATION: 97 % | RESPIRATION RATE: 20 BRPM | HEART RATE: 88 BPM | SYSTOLIC BLOOD PRESSURE: 137 MMHG | DIASTOLIC BLOOD PRESSURE: 83 MMHG | TEMPERATURE: 97 F

## 2023-04-05 VITALS
OXYGEN SATURATION: 97 % | TEMPERATURE: 98 F | SYSTOLIC BLOOD PRESSURE: 129 MMHG | DIASTOLIC BLOOD PRESSURE: 82 MMHG | HEART RATE: 108 BPM | RESPIRATION RATE: 18 BRPM

## 2023-04-05 DIAGNOSIS — R11.10 VOMITING: ICD-10-CM

## 2023-04-05 DIAGNOSIS — R63.4 WEIGHT LOSS: ICD-10-CM

## 2023-04-05 DIAGNOSIS — K56.609 COLONIC OBSTRUCTION: Primary | ICD-10-CM

## 2023-04-05 DIAGNOSIS — Z53.21 ELOPED FROM EMERGENCY DEPARTMENT: Primary | ICD-10-CM

## 2023-04-05 LAB — CEA SERPL-MCNC: 1.9 NG/ML (ref 0–5)

## 2023-04-05 PROCEDURE — D9220A PRA ANESTHESIA: Mod: 33,ANES,, | Performed by: ANESTHESIOLOGY

## 2023-04-05 PROCEDURE — 88305 TISSUE EXAM BY PATHOLOGIST: CPT | Mod: 26,,, | Performed by: PATHOLOGY

## 2023-04-05 PROCEDURE — 45381 PR COLONOSCPY,FLEX,W/DIR SUBMUC INJECT: ICD-10-PCS | Mod: 52,,, | Performed by: INTERNAL MEDICINE

## 2023-04-05 PROCEDURE — 25000003 PHARM REV CODE 250: Performed by: STUDENT IN AN ORGANIZED HEALTH CARE EDUCATION/TRAINING PROGRAM

## 2023-04-05 PROCEDURE — 99283 EMERGENCY DEPT VISIT LOW MDM: CPT | Mod: 25

## 2023-04-05 PROCEDURE — 37000009 HC ANESTHESIA EA ADD 15 MINS: Performed by: INTERNAL MEDICINE

## 2023-04-05 PROCEDURE — D9220A PRA ANESTHESIA: ICD-10-PCS | Mod: 33,CRNA,, | Performed by: STUDENT IN AN ORGANIZED HEALTH CARE EDUCATION/TRAINING PROGRAM

## 2023-04-05 PROCEDURE — D9220A PRA ANESTHESIA: Mod: 33,CRNA,, | Performed by: STUDENT IN AN ORGANIZED HEALTH CARE EDUCATION/TRAINING PROGRAM

## 2023-04-05 PROCEDURE — 36415 COLL VENOUS BLD VENIPUNCTURE: CPT | Performed by: INTERNAL MEDICINE

## 2023-04-05 PROCEDURE — 99282 EMERGENCY DEPT VISIT SF MDM: CPT | Mod: ,,, | Performed by: STUDENT IN AN ORGANIZED HEALTH CARE EDUCATION/TRAINING PROGRAM

## 2023-04-05 PROCEDURE — 82378 CARCINOEMBRYONIC ANTIGEN: CPT | Performed by: INTERNAL MEDICINE

## 2023-04-05 PROCEDURE — 88342 IMHCHEM/IMCYTCHM 1ST ANTB: CPT | Performed by: PATHOLOGY

## 2023-04-05 PROCEDURE — 45380 COLONOSCOPY AND BIOPSY: CPT | Mod: 74 | Performed by: INTERNAL MEDICINE

## 2023-04-05 PROCEDURE — 43239 EGD BIOPSY SINGLE/MULTIPLE: CPT | Performed by: INTERNAL MEDICINE

## 2023-04-05 PROCEDURE — 99282 PR EMERGENCY DEPT VISIT,LEVEL II: ICD-10-PCS | Mod: ,,, | Performed by: STUDENT IN AN ORGANIZED HEALTH CARE EDUCATION/TRAINING PROGRAM

## 2023-04-05 PROCEDURE — 88305 TISSUE EXAM BY PATHOLOGIST: CPT | Mod: 59 | Performed by: PATHOLOGY

## 2023-04-05 PROCEDURE — 88342 CHG IMMUNOCYTOCHEMISTRY: ICD-10-PCS | Mod: 26,,, | Performed by: PATHOLOGY

## 2023-04-05 PROCEDURE — 43239 EGD BIOPSY SINGLE/MULTIPLE: CPT | Mod: 51,,, | Performed by: INTERNAL MEDICINE

## 2023-04-05 PROCEDURE — 43239 PR EGD, FLEX, W/BIOPSY, SGL/MULTI: ICD-10-PCS | Mod: 51,,, | Performed by: INTERNAL MEDICINE

## 2023-04-05 PROCEDURE — 45381 COLONOSCOPY SUBMUCOUS NJX: CPT | Mod: 52,,, | Performed by: INTERNAL MEDICINE

## 2023-04-05 PROCEDURE — D9220A PRA ANESTHESIA: ICD-10-PCS | Mod: 33,ANES,, | Performed by: ANESTHESIOLOGY

## 2023-04-05 PROCEDURE — 37000008 HC ANESTHESIA 1ST 15 MINUTES: Performed by: INTERNAL MEDICINE

## 2023-04-05 PROCEDURE — 45381 COLONOSCOPY SUBMUCOUS NJX: CPT | Mod: 74 | Performed by: INTERNAL MEDICINE

## 2023-04-05 PROCEDURE — 27201012 HC FORCEPS, HOT/COLD, DISP: Performed by: INTERNAL MEDICINE

## 2023-04-05 PROCEDURE — 63600175 PHARM REV CODE 636 W HCPCS: Performed by: STUDENT IN AN ORGANIZED HEALTH CARE EDUCATION/TRAINING PROGRAM

## 2023-04-05 PROCEDURE — 45380 PR COLONOSCOPY,BIOPSY: ICD-10-PCS | Mod: 52,,, | Performed by: INTERNAL MEDICINE

## 2023-04-05 PROCEDURE — 88305 TISSUE EXAM BY PATHOLOGIST: ICD-10-PCS | Mod: 26,,, | Performed by: PATHOLOGY

## 2023-04-05 PROCEDURE — 88342 IMHCHEM/IMCYTCHM 1ST ANTB: CPT | Mod: 26,,, | Performed by: PATHOLOGY

## 2023-04-05 PROCEDURE — 45380 COLONOSCOPY AND BIOPSY: CPT | Mod: 52,,, | Performed by: INTERNAL MEDICINE

## 2023-04-05 RX ORDER — LIDOCAINE HYDROCHLORIDE 20 MG/ML
INJECTION, SOLUTION EPIDURAL; INFILTRATION; INTRACAUDAL; PERINEURAL
Status: DISCONTINUED
Start: 2023-04-05 | End: 2023-04-05 | Stop reason: HOSPADM

## 2023-04-05 RX ORDER — HALOPERIDOL 5 MG/ML
2.5 INJECTION INTRAMUSCULAR
Status: DISCONTINUED | OUTPATIENT
Start: 2023-04-05 | End: 2023-04-05 | Stop reason: HOSPADM

## 2023-04-05 RX ORDER — LIDOCAINE HYDROCHLORIDE 20 MG/ML
INJECTION INTRAVENOUS
Status: DISCONTINUED | OUTPATIENT
Start: 2023-04-05 | End: 2023-04-05

## 2023-04-05 RX ORDER — PROPOFOL 10 MG/ML
VIAL (ML) INTRAVENOUS
Status: DISCONTINUED | OUTPATIENT
Start: 2023-04-05 | End: 2023-04-05

## 2023-04-05 RX ORDER — PROPOFOL 10 MG/ML
INJECTION, EMULSION INTRAVENOUS
Status: DISCONTINUED
Start: 2023-04-05 | End: 2023-04-05 | Stop reason: HOSPADM

## 2023-04-05 RX ORDER — SODIUM CHLORIDE 9 MG/ML
INJECTION, SOLUTION INTRAVENOUS CONTINUOUS
Status: ACTIVE | OUTPATIENT
Start: 2023-04-05

## 2023-04-05 RX ADMIN — LIDOCAINE HYDROCHLORIDE 100 MG: 20 INJECTION, SOLUTION INTRAVENOUS at 01:04

## 2023-04-05 RX ADMIN — PROPOFOL 50 MG: 10 INJECTION, EMULSION INTRAVENOUS at 01:04

## 2023-04-05 RX ADMIN — PROPOFOL 40 MG: 10 INJECTION, EMULSION INTRAVENOUS at 01:04

## 2023-04-05 RX ADMIN — PROPOFOL 20 MG: 10 INJECTION, EMULSION INTRAVENOUS at 01:04

## 2023-04-05 RX ADMIN — PROPOFOL 30 MG: 10 INJECTION, EMULSION INTRAVENOUS at 01:04

## 2023-04-05 RX ADMIN — PROPOFOL 150 MG: 10 INJECTION, EMULSION INTRAVENOUS at 01:04

## 2023-04-05 RX ADMIN — SODIUM CHLORIDE: 0.9 INJECTION, SOLUTION INTRAVENOUS at 01:04

## 2023-04-05 NOTE — H&P
Short Stay Endoscopy History and Physical    PCP - Primary Doctor No    Procedure - EGD/Colonoscopy  ASA - per anesthesia  Mallampati - per anesthesia  History of Anesthesia problems - no  Family history Anesthesia problems -  no   Plan of anesthesia - MAC    HPI:  This is a 57 y.o. male here for evaluation of :     EGD - abdominal pain, nausea, bloating, weight loss  Colon - abnormal imaging of the colon.    ROS:  Constitutional: No fevers, chills, No weight loss  CV: No chest pain  Pulm: No cough, No shortness of breath  Ophtho: No vision changes  GI: see HPI  Derm: No rash    Medical History:  has no past medical history on file.    Surgical History:  has a past surgical history that includes Colonoscopy (N/A, 11/19/2018).    Family History: family history includes Diabetes in his brother, father, and mother; Heart disease in his mother; No Known Problems in his brother, brother, daughter, daughter, sister, sister, sister, sister, and son; Sickle cell anemia in his brother and brother.. Otherwise no colon cancer, inflammatory bowel disease, or GI malignancies.    Social History:  reports that he has never smoked. He has never used smokeless tobacco. He reports current alcohol use of about 4.0 standard drinks per week. He reports that he does not use drugs.    Review of patient's allergies indicates:  No Known Allergies    Medications:   Medications Prior to Admission   Medication Sig Dispense Refill Last Dose    ascorbic acid, vitamin C, (VITAMIN C) 500 MG tablet Take 500 mg by mouth.       ergocalciferol, vitamin D2, 10 mcg (400 unit) Tab Take 5,000 mg by mouth.       ondansetron (ZOFRAN-ODT) 4 MG TbDL Take 1 tablet (4 mg total) by mouth every 6 (six) hours as needed (Nausea). 20 tablet 0     zinc gluconate 50 mg tablet Take 50 mg by mouth.          Physical Exam:    Vital Signs:   Vitals:    04/05/23 1203   BP: 122/89   Pulse:    Resp:    Temp:        Gen: NAD, lying comfortably  HENT: NCAT, oropharynx  clear  Eyes: anicteric sclerae, EOMI grossly  Neck: supple, no visible masses/goiter  Cardiac: RRR  Lungs: non-labored breathing  Abd: soft, NT/ND, normoactive BS  Ext: no LE edema, warm, well perfused  Skin: skin intact on exposed body parts, no visible rashes, lesions  Neuro: A&Ox4, neuro exam grossly intact, moves all extremities  Psych: appropriate mood, affect      Labs:  Lab Results   Component Value Date    WBC 5.54 08/04/2022    HGB 14.4 08/04/2022    HCT 45.0 08/04/2022     08/04/2022    CHOL 222 (H) 08/04/2022    TRIG 89 08/04/2022    HDL 55 08/04/2022    ALT 27 08/04/2022    AST 29 08/04/2022     08/04/2022    K 4.2 08/04/2022     08/04/2022    CREATININE 1.4 08/04/2022    BUN 18 08/04/2022    CO2 24 08/04/2022    TSH 0.759 08/02/2018    PSA 0.30 08/04/2022    HGBA1C 6.2 (H) 08/04/2022       Plan:  EGD for abdominal pain, nausea, bloating, weight loss  Colonoscopy for abnormal imaging of the colon.    I have explained the risks and benefits of endoscopy procedures to the patient including but not limited to bleeding, perforation, infection, and death.  The patient was asked if they understand and allowed to ask any further questions to their satisfaction.    Vivi Butler MD

## 2023-04-05 NOTE — TRANSFER OF CARE
Anesthesia Transfer of Care Note    Patient: Terrie Schroeder    Procedure(s) Performed: Procedure(s) (LRB):  EGD (ESOPHAGOGASTRODUODENOSCOPY) (N/A)  COLONOSCOPY (N/A)    Patient location: GI    Anesthesia Type: general    Transport from OR: Transported from OR on room air with adequate spontaneous ventilation    Post pain: adequate analgesia    Post assessment: no apparent anesthetic complications and tolerated procedure well    Post vital signs: stable    Level of consciousness: sedated    Nausea/Vomiting: no nausea/vomiting    Complications: none    Transfer of care protocol was followed      Last vitals:   Visit Vitals  /65 (BP Location: Left arm, Patient Position: Lying)   Pulse 93   Temp 36.3 °C (97.4 °F) (Oral)   Resp 16   SpO2 95%

## 2023-04-05 NOTE — ANESTHESIA PREPROCEDURE EVALUATION
04/05/2023    Pre-operative evaluation for Procedure(s) (LRB):  EGD (ESOPHAGOGASTRODUODENOSCOPY) (N/A)  COLONOSCOPY (N/A)    Terrie Schroeder is a 57 y.o. male     Patient Active Problem List   Diagnosis    Class 2 obesity due to excess calories without serious comorbidity with body mass index (BMI) of 37.0 to 37.9 in adult    Prediabetes    Screening for colon cancer 11/2018 colonoscopy normal lymphoid aggregate    HLD (hyperlipidemia)    Sinus tachycardia       Review of patient's allergies indicates:  No Known Allergies    No current facility-administered medications on file prior to encounter.     Current Outpatient Medications on File Prior to Encounter   Medication Sig Dispense Refill    ascorbic acid, vitamin C, (VITAMIN C) 500 MG tablet Take 500 mg by mouth.      ergocalciferol, vitamin D2, 10 mcg (400 unit) Tab Take 5,000 mg by mouth.      zinc gluconate 50 mg tablet Take 50 mg by mouth.         Past Surgical History:   Procedure Laterality Date    COLONOSCOPY N/A 11/19/2018    Procedure: COLONOSCOPY;  Surgeon: GEORGES Schneider MD;  Location: [address];  Service: Endoscopy;  Laterality: N/A;       Social History     Socioeconomic History    Marital status:      Spouse name: Nano    Number of children: 3   Occupational History    Occupation: [employer] - .    Tobacco Use    Smoking status: Never    Smokeless tobacco: Never   Substance and Sexual Activity    Alcohol use: Yes     Alcohol/week: 4.0 standard drinks     Types: 4 Cans of beer per week     Comment: Occasionally    Drug use: No    Sexual activity: Not Currently   Social History Narrative    ** Merged History Encounter **          VITALS    Vitals:    04/05/23 1203   BP: 122/89   Pulse:    Resp:    Temp:            Pre-op Assessment    I have reviewed the Patient Summary Reports.       I have reviewed the Medications.     Review of Systems  Anesthesia Hx:  No problems with previous  Anesthesia  Denies Family Hx of Anesthesia complications.   Denies Personal Hx of Anesthesia complications.   Social:  Non-Smoker    Hematology/Oncology:  Hematology Normal   Oncology Normal     EENT/Dental:EENT/Dental Normal   Cardiovascular:   Exercise tolerance: good hyperlipidemia    Pulmonary:  Pulmonary Normal    Renal/:  Renal/ Normal     Hepatic/GI:   Bowel Prep.    Musculoskeletal:  Musculoskeletal Normal    Neurological:  Neurology Normal    Endocrine:  Obesity / BMI > 30  Dermatological:  Skin Normal    Psych:  Psychiatric Normal           Physical Exam  General: Well nourished, Cooperative and Alert    Airway:  Mallampati: I   Mouth Opening: Normal  TM Distance: Normal  Tongue: Normal  Neck ROM: Normal ROM    Dental:  Intact    Chest/Lungs:  Normal Respiratory Rate    Heart:  Rate: Normal  Rhythm: Regular Rhythm        Anesthesia Plan  Type of Anesthesia, risks & benefits discussed:    Anesthesia Type: Gen Natural Airway  Intra-op Monitoring Plan: Standard ASA Monitors  Induction:  IV  Informed Consent: Informed consent signed with the Patient and all parties understand the risks and agree with anesthesia plan.  All questions answered.   ASA Score: 2    Ready For Surgery From Anesthesia Perspective.     .

## 2023-04-05 NOTE — PROVATION PATIENT INSTRUCTIONS
Discharge Summary/Instructions after an Endoscopic Procedure  Patient Name: Terrie Schroeder  Patient MRN: [id number]  Patient YOB: 1965 Wednesday, April 5, 2023  Vivi Butler MD  Dear patient,  As a result of recent federal legislation (The Federal Cures Act), you may   receive lab or pathology results from your procedure in your [facility]   account before your physician is able to contact you. Your physician or   their representative will relay the results to you with their   recommendations at their soonest availability.  Thank you,  RESTRICTIONS:  During your procedure today, you received medications for sedation.  These   medications may affect your judgment, balance and coordination.  Therefore,   for 24 hours, you have the following restrictions:   - DO NOT drive a car, operate machinery, make legal/financial decisions,   sign important papers or drink alcohol.    ACTIVITY:  Today: no heavy lifting, straining or running due to procedural   sedation/anesthesia.  The following day: return to full activity including work.  DIET:  Eat and drink normally unless instructed otherwise.     TREATMENT FOR COMMON SIDE EFFECTS:  - Mild abdominal pain, nausea, belching, bloating or excessive gas:  rest,   eat lightly and use a heating pad.  - Sore Throat: treat with throat lozenges and/or gargle with warm salt   water.  - Because air was used during the procedure, expelling large amounts of air   from your rectum or belching is normal.  - If a bowel prep was taken, you may not have a bowel movement for 1-3 days.    This is normal.  SYMPTOMS TO WATCH FOR AND REPORT TO YOUR PHYSICIAN:  1. Abdominal pain or bloating, other than gas cramps.  2. Chest pain.  3. Back pain.  4. Signs of infection such as: chills or fever occurring within 24 hours   after the procedure.  5. Rectal bleeding, which would show as bright red, maroon, or black stools.   (A tablespoon of blood from the rectum is not serious, especially  if   hemorrhoids are present.)  6. Vomiting.  7. Weakness or dizziness.  GO DIRECTLY TO THE NEAREST EMERGENCY ROOM IF YOU HAVE ANY OF THE FOLLOWING:      Difficulty breathing              Chills and/or fever over 101 F   Persistent vomiting and/or vomiting blood   Severe abdominal pain   Severe chest pain   Black, tarry stools   Bleeding- more than one tablespoon   Any other symptom or condition that you feel may need urgent attention  Your doctor recommends these additional instructions:  If any biopsies were taken, your doctors clinic will contact you in 1 to 2   weeks with any results.  - Discussed sending the patient to the ED for likely obstruction of the   colon.  He does not wish to go to the ED.  Currently he denies abdominal   pain, nausea, vomiting and reports he is passing gas and liquid stool.  - I will request an urgent Colon and rectal surgery visit.  - CEA level ordered.  For questions, problems or results please call your physician - Vivi Butler MD at Work:  [phone].  [facility] can be reached at [phone]     IF A COMPLICATION OR EMERGENCY SITUATION ARISES AND YOU ARE UNABLE TO REACH   YOUR PHYSICIAN - GO DIRECTLY TO THE EMERGENCY ROOM.  Vivi Butler MD  4/5/2023 2:12:09 PM  This report has been verified and signed electronically.  Dear patient,  As a result of recent federal legislation (The Federal Cures Act), you may   receive lab or pathology results from your procedure in your [facility]   account before your physician is able to contact you. Your physician or   their representative will relay the results to you with their   recommendations at their soonest availability.  Thank you,  PROVATION

## 2023-04-05 NOTE — PROVATION PATIENT INSTRUCTIONS
Discharge Summary/Instructions after an Endoscopic Procedure  Patient Name: Terrie Schroeder  Patient MRN: [id number]  Patient YOB: 1965 Wednesday, April 5, 2023  Vivi Butler MD  Dear patient,  As a result of recent federal legislation (The Federal Cures Act), you may   receive lab or pathology results from your procedure in your [facility]   account before your physician is able to contact you. Your physician or   their representative will relay the results to you with their   recommendations at their soonest availability.  Thank you,  RESTRICTIONS:  During your procedure today, you received medications for sedation.  These   medications may affect your judgment, balance and coordination.  Therefore,   for 24 hours, you have the following restrictions:   - DO NOT drive a car, operate machinery, make legal/financial decisions,   sign important papers or drink alcohol.    ACTIVITY:  Today: no heavy lifting, straining or running due to procedural   sedation/anesthesia.  The following day: return to full activity including work.  DIET:  Eat and drink normally unless instructed otherwise.     TREATMENT FOR COMMON SIDE EFFECTS:  - Mild abdominal pain, nausea, belching, bloating or excessive gas:  rest,   eat lightly and use a heating pad.  - Sore Throat: treat with throat lozenges and/or gargle with warm salt   water.  - Because air was used during the procedure, expelling large amounts of air   from your rectum or belching is normal.  - If a bowel prep was taken, you may not have a bowel movement for 1-3 days.    This is normal.  SYMPTOMS TO WATCH FOR AND REPORT TO YOUR PHYSICIAN:  1. Abdominal pain or bloating, other than gas cramps.  2. Chest pain.  3. Back pain.  4. Signs of infection such as: chills or fever occurring within 24 hours   after the procedure.  5. Rectal bleeding, which would show as bright red, maroon, or black stools.   (A tablespoon of blood from the rectum is not serious, especially  if   hemorrhoids are present.)  6. Vomiting.  7. Weakness or dizziness.  GO DIRECTLY TO THE NEAREST EMERGENCY ROOM IF YOU HAVE ANY OF THE FOLLOWING:      Difficulty breathing              Chills and/or fever over 101 F   Persistent vomiting and/or vomiting blood   Severe abdominal pain   Severe chest pain   Black, tarry stools   Bleeding- more than one tablespoon   Any other symptom or condition that you feel may need urgent attention  Your doctor recommends these additional instructions:  If any biopsies were taken, your doctors clinic will contact you in 1 to 2   weeks with any results.  - Proceed to colonoscopy.  - Resume previous diet.   - Continue present medications.   - Await pathology results.  For questions, problems or results please call your physician - Vivi Butler MD at Work:  [phone].  [facility] can be reached at [phone]     IF A COMPLICATION OR EMERGENCY SITUATION ARISES AND YOU ARE UNABLE TO REACH   YOUR PHYSICIAN - GO DIRECTLY TO THE EMERGENCY ROOM.  Vivi Butler MD  4/5/2023 1:41:47 PM  This report has been verified and signed electronically.  Dear patient,  As a result of recent federal legislation (The Federal Cures Act), you may   receive lab or pathology results from your procedure in your [facility]   account before your physician is able to contact you. Your physician or   their representative will relay the results to you with their   recommendations at their soonest availability.  Thank you,  PROVATION

## 2023-04-05 NOTE — PLAN OF CARE
Procedure and recovery complete. Pt awake and alert. MD and nephew at bedside, procedure findings and suggestions discussed. Discharge instructions given, pt verbalized understanding of instructions. Gait steady, able to ambulate without assistance. Pt walked out accompanied by nephew.

## 2023-04-06 ENCOUNTER — TELEPHONE (OUTPATIENT)
Dept: SURGERY | Facility: CLINIC | Age: 58
End: 2023-04-06
Payer: COMMERCIAL

## 2023-04-06 NOTE — TELEPHONE ENCOUNTER
Tried calling pt's spouse to reach him and get him set up with an appt. Mailbox full.  Will try home number listed.

## 2023-04-06 NOTE — TELEPHONE ENCOUNTER
Called pt's wife to let her know that I am trying to get pt an appt with a CRS surgeon for further eval.  Pt's wife placed me on hold and never cam back to the phone.  I hung up and called her phone right back and it just rang and went to voicemail which is not available for leaving messages.  Will try calling again.

## 2023-04-06 NOTE — ED NOTES
Pt ambulated from ED prior to workup. Pt did not receive any discharge instructions. Dr. Jorgensen notified.

## 2023-04-06 NOTE — TELEPHONE ENCOUNTER
"RN finally reached pt and his wife to get him scheduled with Dr. Schneider in [facility] on 4/12 for further eval.  Pt's wife stated that at this time they are moving pt's care to [facility] instead which is where the wife works.  Pt's wife reported that the pt continued to have symptoms after his scopes, so he went to the ED last night for assistance, and he was not able to get admitted for observation or further testing due to the shortage of beds. I believe they were upset and pt was sent home. RN apologized and offered support.  Pt's wife was ready to end the call and said "Thank you."    "

## 2023-04-06 NOTE — ED PROVIDER NOTES
Encounter Date: 4/5/2023       History     Chief Complaint   Patient presents with    Vomiting     X couple weeks, unable to keep anything down      57-year-old male with a history of recently suspected colon cancer on CT who underwent endoscopy today presents with persistent nausea and vomiting.  Patient vomiting multiple times prior to arrival.  No fevers.  He said the endoscopies went without issue.  No chest pain shortness a breath.  No neck pain.  No diaphoresis.  He is having loose stools and passing gas but no formed stools.  No dysuria hematuria.    Review of patient's allergies indicates:  No Known Allergies  No past medical history on file.  Past Surgical History:   Procedure Laterality Date    COLONOSCOPY N/A 11/19/2018    Procedure: COLONOSCOPY;  Surgeon: GEORGES Schneider MD;  Location: [facility] ([address] [place] [address]);  Service: Endoscopy;  Laterality: N/A;     Family History   Problem Relation Age of Onset    Heart disease Mother     Diabetes Mother     Diabetes Father     No Known Problems Sister     No Known Problems Sister     No Known Problems Sister     No Known Problems Sister     Diabetes Brother     Sickle cell anemia Brother     Sickle cell anemia Brother     No Known Problems Brother     No Known Problems Brother     No Known Problems Daughter     No Known Problems Daughter     No Known Problems Son     Colon cancer Neg Hx     Esophageal cancer Neg Hx      Social History     Tobacco Use    Smoking status: Never    Smokeless tobacco: Never   Substance Use Topics    Alcohol use: Yes     Alcohol/week: 4.0 standard drinks     Types: 4 Cans of beer per week     Comment: Occasionally    Drug use: No     Review of Systems   All other systems reviewed and are negative.    Physical Exam     Initial Vitals [04/05/23 1857]   BP Pulse Resp Temp SpO2   129/82 108 18 97.8 °F (36.6 °C) 97 %      MAP       --         Physical Exam    Nursing note and vitals reviewed.  Constitutional: He appears well-developed and  well-nourished.   HENT:   Head: Normocephalic and atraumatic.   Eyes: EOM are normal. Pupils are equal, round, and reactive to light.   Neck: Neck supple.   Normal range of motion.  Cardiovascular:  Normal rate and regular rhythm.           Pulmonary/Chest: Breath sounds normal. No respiratory distress.   Abdominal: Abdomen is soft. There is abdominal tenderness.   Musculoskeletal:         General: No edema. Normal range of motion.      Cervical back: Normal range of motion and neck supple.     Neurological: He is alert and oriented to person, place, and time.   Skin: Skin is warm and dry. Capillary refill takes less than 2 seconds.   Psychiatric: He has a normal mood and affect. Thought content normal.       ED Course   Procedures  Labs Reviewed   HIV 1 / 2 ANTIBODY   HEPATITIS C ANTIBODY   CBC W/ AUTO DIFFERENTIAL   COMPREHENSIVE METABOLIC PANEL   LIPASE   URINALYSIS, REFLEX TO URINE CULTURE   ISTAT CHEM8          Imaging Results    None          Medications   sodium chloride 0.9% bolus 1,000 mL 1,000 mL (has no administration in time range)   haloperidol lactate injection 2.5 mg (has no administration in time range)     Medical Decision Making:   Initial Assessment:   Hemodynamically stable. Afebrile. Phonating and protecting the airway spontaneously. No clinical evidence for cardiovascular instability or impending airway compromise. Examination as above.  Discussed the case with Colorectal surgery.  They recommended a repeat CT abdomen pelvis W contrast in the event there is a complete obstruction therefore would be surgical.           ED Course as of 04/05/23 2232 Wed Apr 05, 2023 1959 CRS fellow paged [BG]   2002 CRS preferred repeat CTAP for possible surgical planning.  [BG]      ED Course User Index  [BG] Tom Jorgensen MD          Patient eloped.       Clinical Impression:   Final diagnoses:  [R11.10] Vomiting  [Z53.21] Eloped from emergency department (Primary)        ED Disposition Condition     Kartik Jorgensen MD  04/05/23 6461

## 2023-04-07 NOTE — ANESTHESIA POSTPROCEDURE EVALUATION
Anesthesia Post Evaluation    Patient: Terrie Schroeder    Procedure(s) Performed: Procedure(s) (LRB):  EGD (ESOPHAGOGASTRODUODENOSCOPY) (N/A)  COLONOSCOPY (N/A)    Final Anesthesia Type: general      Patient location during evaluation: GI PACU  Patient participation: Yes- Able to Participate  Level of consciousness: awake and alert and oriented  Post-procedure vital signs: reviewed and stable  Pain management: adequate  Airway patency: patent    PONV status at discharge: No PONV  Anesthetic complications: no      Cardiovascular status: blood pressure returned to baseline and hemodynamically stable  Respiratory status: unassisted, spontaneous ventilation and room air  Hydration status: euvolemic  Follow-up not needed.          Vitals Value Taken Time   /82 04/05/23 1857   Temp 36.6 °C (97.8 °F) 04/05/23 1857   Pulse 108 04/05/23 1857   Resp 18 04/05/23 1857   SpO2 97 % 04/05/23 1857         Event Time   Out of Recovery 15:14:44         Pain/Christa Score: No data recorded

## 2023-04-11 ENCOUNTER — TELEPHONE (OUTPATIENT)
Dept: GASTROENTEROLOGY | Facility: CLINIC | Age: 58
End: 2023-04-11
Payer: COMMERCIAL

## 2023-04-11 NOTE — TELEPHONE ENCOUNTER
----- Message from Ru Rashid sent at 4/11/2023  1:18 PM CDT -----  Contact: @[phone]  Caller wife is calling in requesting a call back in regards to his procedure, please call to discuss further.

## 2023-04-11 NOTE — TELEPHONE ENCOUNTER
[facility]    I have spoken with both the patient and his wife.  He is currently hospitalized at [facility] awaiting surgery for his likely malignant colon obstruction.  I have let them know that his pathology results are still in process.  I will let them know these results when they are available.    Vivi Butler MD

## 2023-04-12 ENCOUNTER — TELEPHONE (OUTPATIENT)
Dept: SURGERY | Facility: CLINIC | Age: 58
End: 2023-04-12
Payer: COMMERCIAL

## 2023-04-12 LAB
FINAL PATHOLOGIC DIAGNOSIS: ABNORMAL
GROSS: ABNORMAL
Lab: ABNORMAL
MICROSCOPIC EXAM: ABNORMAL

## 2023-04-12 NOTE — TELEPHONE ENCOUNTER
Called patient to reschedule his appointment that was missed with Dr. Schneider, patient states that he meant to cancel appointment. Informed patient that I would go ahead and cancel the appointment for him.

## 2023-04-14 NOTE — PROGRESS NOTES
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  The patient is currently admitted at [facility] and surgery for resection of the obstructed part of his colon is scheduled for next week.    Vivi Butler MD

## 2023-04-25 ENCOUNTER — PATIENT MESSAGE (OUTPATIENT)
Dept: RESEARCH | Facility: HOSPITAL | Age: 58
End: 2023-04-25
Payer: COMMERCIAL

## 2023-05-02 ENCOUNTER — PATIENT MESSAGE (OUTPATIENT)
Dept: RESEARCH | Facility: HOSPITAL | Age: 58
End: 2023-05-02
Payer: COMMERCIAL

## 2023-08-03 ENCOUNTER — LAB VISIT (OUTPATIENT)
Dept: LAB | Facility: HOSPITAL | Age: 58
End: 2023-08-03
Attending: INTERNAL MEDICINE
Payer: COMMERCIAL

## 2023-08-03 DIAGNOSIS — Z00.00 NORMAL PHYSICAL EXAM: ICD-10-CM

## 2023-08-03 PROBLEM — C18.6 MALIGNANT NEOPLASM OF DESCENDING COLON: Status: ACTIVE | Noted: 2023-05-17

## 2023-08-03 LAB
ALBUMIN SERPL BCP-MCNC: 3.6 G/DL (ref 3.5–5.2)
ALP SERPL-CCNC: 61 U/L (ref 55–135)
ALT SERPL W/O P-5'-P-CCNC: 40 U/L (ref 10–44)
ANION GAP SERPL CALC-SCNC: 9 MMOL/L (ref 8–16)
AST SERPL-CCNC: 45 U/L (ref 10–40)
BASOPHILS # BLD AUTO: 0.02 K/UL (ref 0–0.2)
BASOPHILS NFR BLD: 0.7 % (ref 0–1.9)
BILIRUB SERPL-MCNC: 0.6 MG/DL (ref 0.1–1)
BUN SERPL-MCNC: 15 MG/DL (ref 6–20)
CALCIUM SERPL-MCNC: 8.7 MG/DL (ref 8.7–10.5)
CHLORIDE SERPL-SCNC: 104 MMOL/L (ref 95–110)
CHOLEST SERPL-MCNC: 208 MG/DL (ref 120–199)
CHOLEST/HDLC SERPL: 4.1 {RATIO} (ref 2–5)
CO2 SERPL-SCNC: 24 MMOL/L (ref 23–29)
COMPLEXED PSA SERPL-MCNC: 0.59 NG/ML (ref 0–4)
CREAT SERPL-MCNC: 1.1 MG/DL (ref 0.5–1.4)
DIFFERENTIAL METHOD: ABNORMAL
EOSINOPHIL # BLD AUTO: 0.1 K/UL (ref 0–0.5)
EOSINOPHIL NFR BLD: 3.1 % (ref 0–8)
ERYTHROCYTE [DISTWIDTH] IN BLOOD BY AUTOMATED COUNT: 14.6 % (ref 11.5–14.5)
EST. GFR  (NO RACE VARIABLE): >60 ML/MIN/1.73 M^2
ESTIMATED AVG GLUCOSE: 131 MG/DL (ref 68–131)
GLUCOSE SERPL-MCNC: 91 MG/DL (ref 70–110)
HBA1C MFR BLD: 6.2 % (ref 4–5.6)
HCT VFR BLD AUTO: 41.9 % (ref 40–54)
HDLC SERPL-MCNC: 51 MG/DL (ref 40–75)
HDLC SERPL: 24.5 % (ref 20–50)
HGB BLD-MCNC: 13.3 G/DL (ref 14–18)
IMM GRANULOCYTES # BLD AUTO: 0 K/UL (ref 0–0.04)
IMM GRANULOCYTES NFR BLD AUTO: 0 % (ref 0–0.5)
LDLC SERPL CALC-MCNC: 119.6 MG/DL (ref 63–159)
LYMPHOCYTES # BLD AUTO: 1.4 K/UL (ref 1–4.8)
LYMPHOCYTES NFR BLD: 47.4 % (ref 18–48)
MCH RBC QN AUTO: 27.1 PG (ref 27–31)
MCHC RBC AUTO-ENTMCNC: 31.7 G/DL (ref 32–36)
MCV RBC AUTO: 86 FL (ref 82–98)
MONOCYTES # BLD AUTO: 0.4 K/UL (ref 0.3–1)
MONOCYTES NFR BLD: 15.3 % (ref 4–15)
NEUTROPHILS # BLD AUTO: 1 K/UL (ref 1.8–7.7)
NEUTROPHILS NFR BLD: 33.5 % (ref 38–73)
NONHDLC SERPL-MCNC: 157 MG/DL
NRBC BLD-RTO: 0 /100 WBC
PLATELET # BLD AUTO: 104 K/UL (ref 150–450)
PMV BLD AUTO: 10.8 FL (ref 9.2–12.9)
POTASSIUM SERPL-SCNC: 3.9 MMOL/L (ref 3.5–5.1)
PROT SERPL-MCNC: 7.1 G/DL (ref 6–8.4)
RBC # BLD AUTO: 4.9 M/UL (ref 4.6–6.2)
SODIUM SERPL-SCNC: 137 MMOL/L (ref 136–145)
TRIGL SERPL-MCNC: 187 MG/DL (ref 30–150)
WBC # BLD AUTO: 2.87 K/UL (ref 3.9–12.7)

## 2023-08-03 PROCEDURE — 85025 COMPLETE CBC W/AUTO DIFF WBC: CPT | Performed by: INTERNAL MEDICINE

## 2023-08-03 PROCEDURE — 36415 COLL VENOUS BLD VENIPUNCTURE: CPT | Mod: PO | Performed by: INTERNAL MEDICINE

## 2023-08-03 PROCEDURE — 84153 ASSAY OF PSA TOTAL: CPT | Performed by: INTERNAL MEDICINE

## 2023-08-03 PROCEDURE — 83036 HEMOGLOBIN GLYCOSYLATED A1C: CPT | Performed by: INTERNAL MEDICINE

## 2023-08-03 PROCEDURE — 80061 LIPID PANEL: CPT | Performed by: INTERNAL MEDICINE

## 2023-08-03 PROCEDURE — 80053 COMPREHEN METABOLIC PANEL: CPT | Performed by: INTERNAL MEDICINE

## 2023-08-18 NOTE — PROGRESS NOTES
This note was created by combination of typed  and M-Modal dictation.  Transcription errors may be present.  If there are any questions, please contact me.    Assessment and Plan:   Assessment and Plan   Normal physical exam  -pre visit labs reviewed  -     PSA, Screening; Future; Expected date: 08/18/2024  -     Lipid Panel; Future; Expected date: 08/18/2024  -     Hemoglobin A1C; Future; Expected date: 08/18/2024    Mixed hyperlipidemia  -work on therapeutic lifestyle modification (TLC).  Main risk to long term health is the colon CA    Prediabetes  -continue to monitor. Hold on initiating medications.     Malignant neoplasm of descending colon  -on chemo FOLFOX, followed by outside heme/onc.  S/p colectomy. Recent PET/CT showing positive response to treatment. Not taking any scheduled pain meds for this.    There are no discontinued medications.    meds sent this encounter:         Follow Up: PEx 1 year.    Future Appointments   Date Time Provider Department Center   8/21/2024  8:00 AM LAB, [facility]   8/26/2024 11:00 AM Inderjit Loco MD [facility]            Subjective:   Subjective   Chief Complaint   Patient presents with    Annual Exam       HPI  Terrie is a 58 y.o. male.     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       Social History     Social History Narrative    ** Merged History Encounter **            Last appointment with this clinic was Visit date not found. Last visit with me 8/4/2022   To summarize last visit and events leading up to today:  Hyperlipidemia, pre diabetes.  Work on TLC.  Family history of sickle cell  2018 colonoscopy normal    03/15/2023 ED for [facility]  colitis  Subsequently seen by GI who recommended EGD and colonoscopy given rectal thickening on CT  04/05/2023 colonoscopy showing stricture of the descending colon path showing high-grade dysplasia/intramucosal carcinoma suspicious for invasion  04/05/2023 EGD normal  04/17/2023 colon resection with adenocarcinoma with focal invasion of the visceral peritoneum.  Underwent FOLFOX chemotherapy  Plan is PET-CT 8/10    8/10/2023 PET/CT  1. Positive response to treatment with no definitive evidence of FDG avid disease on this exam. The lesion in the dome of the right lobe of the liver is no longer visualized.   2. Indeterminate bilateral axillary lymph nodes. These demonstrate normal morphology and low-level hypermetabolic activity. This may be reactive, however metastatic disease cannot be entirely excluded although this would be an atypical location     Labs 8/3   CBC pancytopenia with platelet count 104  CMP AST mildly elevated  Lipid profile not ideal with non   A1c 6.2   PSA normal    Today's visit:  Please note: Chronic medical problems that are stable but are being addressed at this visit may not be listed/documented here, but may be addressed in more detail in the Assessment and Plan section.   Below are salient features of chronic medical conditions, or new/acute conditions and their details.    Cold intolerance with chemo  Can't stand cold drinks, cold food  And cant' touch cold objects    S/p resection  With colostomy.    Unable to return to work.  Getting disability.      Not really taking anything other than the chemo  Not taking pranav  infrequent nausea medication use.     Lives with wife and son.  Wife works  Son in college.      Eats salads  And fruit  Coke zero  And gatorade  Trying to avoid formal dx of DM    Patient Care Team:  Inderjit Loco MD as PCP - General (Internal Medicine)  Osito Ruth MA as Care Coordinator    Patient Active Problem List    Diagnosis Date Noted    Malignant  neoplasm of descending colon 05/17/2023     - Presentation with N/V April 2023 ([facility])  - Resected April 17 (Dr Saleh): Adenocarcinoma with focal invasion of the visceral peritoneum. 6 of 37 nodes were involved and he had perineural and lymphovascular invasion. All margins were negative.   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 KRAS/NRAS wild type and BRAF wild type.  - Microsatelite Stable   - ? Liver mets (CT ABd/Pel June 6)  Suspected liver mets         HLD (hyperlipidemia) 01/26/2021 02/25/2021 stress test  Impression: Negative stress echocardiogram          Sinus tachycardia 01/26/2021 03/03/2021 Holter monitor   Impression: 1.0 sinus rhythm with sinus tachycardia 2.0 rare PVC 3.0 rare PAC   02/05/2021 echo  Summary:  1. Estimated left ventricular ejection fraction is 60 to 65%.  2. Normal left ventricular systolic function.  3. Resting tachycardia.  4. Right ventricular systolic function is normal.      Screening for colon cancer 11/2018 colonoscopy normal lymphoid aggregate 11/12/2018    Prediabetes 08/03/2018    Class 2 obesity due to excess calories without serious comorbidity with body mass index (BMI) of 37.0 to 37.9 in adult 08/02/2018       PAST MEDICAL PROBLEMS, PAST SURGICAL HISTORY: please see relevant portions of the electronic medical record    ALLERGIES AND MEDICATIONS: updated and reviewed.  Medication List with Changes/Refills   Current Medications    ASCORBIC ACID, VITAMIN C, (VITAMIN C) 500 MG TABLET    Take 500 mg by mouth.    ERGOCALCIFEROL, VITAMIN D2, 10 MCG (400 UNIT) TAB    Take 5,000 mg by mouth.    ONDANSETRON (ZOFRAN-ODT) 4 MG TBDL    Take 1 tablet (4 mg total) by mouth every 6 (six) hours as needed (Nausea).    ZINC GLUCONATE 50 MG TABLET    Take 50 mg by mouth.         Objective:   Objective   Physical Exam   Vitals:    08/21/23 1338   Pulse: 90   Temp: 97.9 °F (36.6 °C)   TempSrc: Oral   SpO2: 98%   Weight: 104.1 kg (229 lb 6.2 oz)   Height: 6' (1.829 m)     Body mass index is 31.11 kg/m².  Weight: 104.1 kg (229 lb 6.2 oz)   Height: 6' (182.9 cm)     Physical Exam  Constitutional:       Appearance: Normal appearance. He is well-developed.   HENT:      Right Ear: Tympanic membrane and external ear normal.      Left Ear: Tympanic membrane and external ear normal.      Nose: Nose normal.   Eyes:      General: No scleral icterus.     Conjunctiva/sclera: Conjunctivae normal.   Neck:      Thyroid: No thyroid mass or thyromegaly.      Trachea: Trachea normal.   Cardiovascular:      Rate and Rhythm: Normal rate and regular rhythm.      Heart sounds: Normal heart sounds, S1 normal and S2 normal. No murmur heard.  Pulmonary:      Effort: Pulmonary effort is normal.      Breath sounds: Normal breath sounds.   Abdominal:      General: There is no distension.      Palpations: Abdomen is soft. There is no hepatomegaly, splenomegaly or mass.      Tenderness: There is no abdominal tenderness.      Comments: Did not examine colostomy site   Musculoskeletal:         General: No deformity.      Right lower leg: No edema.      Left lower leg: No edema.   Lymphadenopathy:      Cervical: No cervical adenopathy.   Skin:     General: Skin is warm and dry.      Findings: No rash (on exposed skin).      Comments: On exposed skin   Neurological:      Mental Status: He is alert and oriented to person, place, and time.      Cranial Nerves: No cranial nerve deficit.      Sensory: No sensory deficit.      Deep Tendon Reflexes: Reflexes are normal and symmetric.   Psychiatric:         Speech: Speech normal.         Behavior: Behavior normal.         Thought Content: Thought content normal.         Judgment: Judgment normal.         Component      Latest Ref Rng & Units 8/3/2023 8/4/2022   WBC      3.90 - 12.70 K/uL 2.87 (L) 5.54   RBC      4.60 - 6.20 M/uL 4.90 5.09   Hemoglobin      14.0 - 18.0 g/dL 13.3 (L) 14.4   Hematocrit      40.0 - 54.0 % 41.9 45.0   MCV      82 - 98 fL 86 88   MCH      27.0  - 31.0 pg 27.1 28.3   MCHC      32.0 - 36.0 g/dL 31.7 (L) 32.0   RDW      11.5 - 14.5 % 14.6 (H) 14.2   Platelets      150 - 450 K/uL 104 (L) 217   MPV      9.2 - 12.9 fL 10.8 10.4   Immature Granulocytes      0.0 - 0.5 % 0.0 0.2   Gran # (ANC)      1.8 - 7.7 K/uL 1.0 (L) 2.7   Immature Grans (Abs)      0.00 - 0.04 K/uL 0.00 0.01   Lymph #      1.0 - 4.8 K/uL 1.4 2.0   Mono #      0.3 - 1.0 K/uL 0.4 0.7   Eos #      0.0 - 0.5 K/uL 0.1 0.1   Baso #      0.00 - 0.20 K/uL 0.02 0.05   nRBC      0 /100 WBC 0 0   Gran %      38.0 - 73.0 % 33.5 (L) 47.8   Lymph %      18.0 - 48.0 % 47.4 36.3   Mono %      4.0 - 15.0 % 15.3 (H) 12.3   Eosinophil %      0.0 - 8.0 % 3.1 2.5   Basophil %      0.0 - 1.9 % 0.7 0.9   Differential Method       Automated Automated   Sodium      136 - 145 mmol/L 137 139   Potassium      3.5 - 5.1 mmol/L 3.9 4.2   Chloride      95 - 110 mmol/L 104 104   CO2      23 - 29 mmol/L 24 24   Glucose      70 - 110 mg/dL 91 88   BUN      6 - 20 mg/dL 15 18   Creatinine      0.5 - 1.4 mg/dL 1.1 1.4   Calcium      8.7 - 10.5 mg/dL 8.7 9.6   PROTEIN TOTAL      6.0 - 8.4 g/dL 7.1 8.1   Albumin      3.5 - 5.2 g/dL 3.6 4.2   BILIRUBIN TOTAL      0.1 - 1.0 mg/dL 0.6 0.8   Alkaline Phosphatase      55 - 135 U/L 61 55   AST      10 - 40 U/L 45 (H) 29   ALT      10 - 44 U/L 40 27   Anion Gap      8 - 16 mmol/L 9 11   eGFR      >60 mL/min/1.73 m:2 >60.0 58.6 (A)   Cholesterol      120 - 199 mg/dL 208 (H) 222 (H)   Triglycerides      30 - 150 mg/dL 187 (H) 89   HDL      40 - 75 mg/dL 51 55   LDL Cholesterol External      63.0 - 159.0 mg/dL 119.6 149.2   HDL/Cholesterol Ratio      20.0 - 50.0 % 24.5 24.8   Total Cholesterol/HDL Ratio      2.0 - 5.0 4.1 4.0   Non-HDL Cholesterol      mg/dL 157 167   Hemoglobin A1C External      4.0 - 5.6 % 6.2 (H) 6.2 (H)   Estimated Avg Glucose      68 - 131 mg/dL 131 131   PSA, Screen      0.00 - 4.00 ng/mL 0.59 0.30

## 2023-08-21 ENCOUNTER — OFFICE VISIT (OUTPATIENT)
Dept: FAMILY MEDICINE | Facility: CLINIC | Age: 58
End: 2023-08-21
Payer: COMMERCIAL

## 2023-08-21 VITALS
BODY MASS INDEX: 31.07 KG/M2 | HEIGHT: 72 IN | TEMPERATURE: 98 F | HEART RATE: 90 BPM | WEIGHT: 229.38 LBS | OXYGEN SATURATION: 98 % | DIASTOLIC BLOOD PRESSURE: 84 MMHG | SYSTOLIC BLOOD PRESSURE: 120 MMHG

## 2023-08-21 DIAGNOSIS — R73.03 PREDIABETES: ICD-10-CM

## 2023-08-21 DIAGNOSIS — Z00.00 NORMAL PHYSICAL EXAM: Primary | ICD-10-CM

## 2023-08-21 DIAGNOSIS — C18.6 MALIGNANT NEOPLASM OF DESCENDING COLON: ICD-10-CM

## 2023-08-21 DIAGNOSIS — E78.2 MIXED HYPERLIPIDEMIA: ICD-10-CM

## 2023-08-21 PROCEDURE — 1159F MED LIST DOCD IN RCRD: CPT | Mod: CPTII,S$GLB,, | Performed by: INTERNAL MEDICINE

## 2023-08-21 PROCEDURE — 99999 PR PBB SHADOW E&M-EST. PATIENT-LVL IV: ICD-10-PCS | Mod: PBBFAC,,, | Performed by: INTERNAL MEDICINE

## 2023-08-21 PROCEDURE — 99999 PR PBB SHADOW E&M-EST. PATIENT-LVL IV: CPT | Mod: PBBFAC,,, | Performed by: INTERNAL MEDICINE

## 2023-08-21 PROCEDURE — 1160F PR REVIEW ALL MEDS BY PRESCRIBER/CLIN PHARMACIST DOCUMENTED: ICD-10-PCS | Mod: CPTII,S$GLB,, | Performed by: INTERNAL MEDICINE

## 2023-08-21 PROCEDURE — 1160F RVW MEDS BY RX/DR IN RCRD: CPT | Mod: CPTII,S$GLB,, | Performed by: INTERNAL MEDICINE

## 2023-08-21 PROCEDURE — 3008F BODY MASS INDEX DOCD: CPT | Mod: CPTII,S$GLB,, | Performed by: INTERNAL MEDICINE

## 2023-08-21 PROCEDURE — 99396 PREV VISIT EST AGE 40-64: CPT | Mod: S$GLB,,, | Performed by: INTERNAL MEDICINE

## 2023-08-21 PROCEDURE — 3044F HG A1C LEVEL LT 7.0%: CPT | Mod: CPTII,S$GLB,, | Performed by: INTERNAL MEDICINE

## 2023-08-21 PROCEDURE — 3079F DIAST BP 80-89 MM HG: CPT | Mod: CPTII,S$GLB,, | Performed by: INTERNAL MEDICINE

## 2023-08-21 PROCEDURE — 3079F PR MOST RECENT DIASTOLIC BLOOD PRESSURE 80-89 MM HG: ICD-10-PCS | Mod: CPTII,S$GLB,, | Performed by: INTERNAL MEDICINE

## 2023-08-21 PROCEDURE — 1159F PR MEDICATION LIST DOCUMENTED IN MEDICAL RECORD: ICD-10-PCS | Mod: CPTII,S$GLB,, | Performed by: INTERNAL MEDICINE

## 2023-08-21 PROCEDURE — 3074F SYST BP LT 130 MM HG: CPT | Mod: CPTII,S$GLB,, | Performed by: INTERNAL MEDICINE

## 2023-08-21 PROCEDURE — 3044F PR MOST RECENT HEMOGLOBIN A1C LEVEL <7.0%: ICD-10-PCS | Mod: CPTII,S$GLB,, | Performed by: INTERNAL MEDICINE

## 2023-08-21 PROCEDURE — 99396 PR PREVENTIVE VISIT,EST,40-64: ICD-10-PCS | Mod: S$GLB,,, | Performed by: INTERNAL MEDICINE

## 2023-08-21 PROCEDURE — 3008F PR BODY MASS INDEX (BMI) DOCUMENTED: ICD-10-PCS | Mod: CPTII,S$GLB,, | Performed by: INTERNAL MEDICINE

## 2023-08-21 PROCEDURE — 3074F PR MOST RECENT SYSTOLIC BLOOD PRESSURE < 130 MM HG: ICD-10-PCS | Mod: CPTII,S$GLB,, | Performed by: INTERNAL MEDICINE

## 2024-08-21 ENCOUNTER — PATIENT MESSAGE (OUTPATIENT)
Dept: ADMINISTRATIVE | Facility: HOSPITAL | Age: 59
End: 2024-08-21
Payer: COMMERCIAL

## 2024-08-21 ENCOUNTER — LAB VISIT (OUTPATIENT)
Dept: LAB | Facility: HOSPITAL | Age: 59
End: 2024-08-21
Attending: INTERNAL MEDICINE
Payer: COMMERCIAL

## 2024-08-21 ENCOUNTER — PATIENT OUTREACH (OUTPATIENT)
Dept: ADMINISTRATIVE | Facility: HOSPITAL | Age: 59
End: 2024-08-21
Payer: COMMERCIAL

## 2024-08-21 DIAGNOSIS — Z00.00 NORMAL PHYSICAL EXAM: ICD-10-CM

## 2024-08-21 LAB
ESTIMATED AVG GLUCOSE: 128 MG/DL (ref 68–131)
HBA1C MFR BLD: 6.1 % (ref 4–5.6)

## 2024-08-21 PROCEDURE — 83036 HEMOGLOBIN GLYCOSYLATED A1C: CPT | Performed by: INTERNAL MEDICINE

## 2024-08-21 PROCEDURE — 36415 COLL VENOUS BLD VENIPUNCTURE: CPT | Mod: PO | Performed by: INTERNAL MEDICINE

## 2024-08-28 ENCOUNTER — LAB VISIT (OUTPATIENT)
Dept: LAB | Facility: HOSPITAL | Age: 59
End: 2024-08-28
Attending: INTERNAL MEDICINE
Payer: COMMERCIAL

## 2024-08-28 DIAGNOSIS — Z00.00 NORMAL PHYSICAL EXAM: ICD-10-CM

## 2024-08-28 LAB
CHOLEST SERPL-MCNC: 172 MG/DL (ref 120–199)
CHOLEST/HDLC SERPL: 3.8 {RATIO} (ref 2–5)
COMPLEXED PSA SERPL-MCNC: 1.2 NG/ML (ref 0–4)
HDLC SERPL-MCNC: 45 MG/DL (ref 40–75)
HDLC SERPL: 26.2 % (ref 20–50)
LDLC SERPL CALC-MCNC: 104.8 MG/DL (ref 63–159)
NONHDLC SERPL-MCNC: 127 MG/DL
TRIGL SERPL-MCNC: 111 MG/DL (ref 30–150)

## 2024-08-28 PROCEDURE — 36415 COLL VENOUS BLD VENIPUNCTURE: CPT | Mod: PO | Performed by: INTERNAL MEDICINE

## 2024-08-28 PROCEDURE — 84153 ASSAY OF PSA TOTAL: CPT | Performed by: INTERNAL MEDICINE

## 2024-08-28 PROCEDURE — 80061 LIPID PANEL: CPT | Performed by: INTERNAL MEDICINE

## 2024-09-17 RX ORDER — CAPECITABINE 150 MG/1
TABLET, FILM COATED ORAL
COMMUNITY
Start: 2024-09-11

## 2024-09-17 NOTE — PROGRESS NOTES
This note was created by combination of typed  and M-Modal dictation.  Transcription errors may be present.   This note was also generated with the assistance of ambient listening technology. Verbal consent was obtained by the patient and accompanying visitor(s) for the recording of patient appointment to facilitate this note. I attest to having reviewed and edited the generated note for accuracy, though some syntax or spelling errors may persist. Please contact the author of this note for any clarification.    Assessment and Plan:   Assessment and Plan   Normal physical exam  - Evaluated cholesterol panel results: total cholesterol 172, triglycerides 111, HDL 45, .  - Explained cholesterol panel results, including desirable ranges for each component.  - Ordered cholesterol panel test.  -     Lipid Panel; Future; Expected date: 09/17/2025  -     PSA, Screening; Future; Expected date: 09/17/2025  -     Hemoglobin A1C; Future; Expected date: 09/17/2025    Prediabetes  - Noted A1C in pre-diabetes range, consistent with previous results.  - Discussed pre-diabetes status and general dietary recommendations, emphasizing flexibility due to current cancer treatment.  - Ordered A1C test.  -     Hemoglobin A1C; Future; Expected date: 09/17/2025    Malignant neoplasm of descending colon  Drug-induced immunodeficiency  - Reviewed ongoing oncology treatment for colon cancer, including recent chemotherapy and radiation.  - Assessed chemotherapy side effects: hand numbness, facial stiffness, and nausea.  - Considered patient's nutritional needs in context of chemotherapy side effects, prioritizing tolerance over strict dietary restrictions.  - Assessed for potential complications: no reported chest pain, shortness of breath, or significant blood in stool.  - Reviewed patient's ability to work part-time while undergoing treatment.  - Continued Xeloda (capecitabine) as prescribed by oncologist.  - Teven to continue  staying hydrated, including drinking zero-sugar Gatorade.  - Teveliel to maintain physical activity as tolerated.  - neuropathy, he declines medication treatment at this time.    FLU AND COVID VACCINATIONS:  - Discussed potential benefits of flu and COVID vaccinations, noting possible reduced efficacy due to immunosuppression from chemotherapy.  - Terrie to consider flu and COVID vaccinations when available, after discussing with oncologist.    CONSTIPATION:  - Continued MiraLAX as needed for constipation.    LABS:  - Ordered PSA test.    FOLLOW UP:  - Follow up next year with bloodwork prior to visit.  - Terrie to schedule early morning appointment around 8:00 AM.           Medications Discontinued During This Encounter   Medication Reason    0.9%  NaCl infusion     ergocalciferol, vitamin D2, 10 mcg (400 unit) Tab Therapy completed    ascorbic acid, vitamin C, (VITAMIN C) 500 MG tablet        meds sent this encounter:         Follow Up:   Future Appointments   Date Time Provider Department Center   [facility]   9/18/2025  1:20 PM Inderjit Loco MD [facility]          Subjective:   Subjective   Chief Complaint   Patient presents with    Annual Exam       HPI  Terrie is a 59 y.o. male.     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      Social History     Social History Narrative    ** Merged History Encounter **            Patient Care Team:  Inderjit Loco MD as PCP - General (Internal Medicine)  Glenroy Curry MA as Care Coordinator    Last appointment with this clinic was Visit date not found. Last visit with me Visit date not found   To  summarize last visit and events leading up to today:  Colon cancer  2023 colonoscopy showing stricture of the descending colon path showing high-grade dysplasia/intramucosal carcinoma suspicious for invasion  2023 EGD normal  2023 colon resection with adenocarcinoma with focal invasion of the visceral peritoneum.  Underwent FOLFOX chemotherapy   11/15/23 colostomy takedown  Progression with liver mets 2024  S/p SBRT  Started on XELIRI 2024. One cycle Xeloda prior   Suspected liver mets   Hyperlipidemia   Pre diabetes  Family history of sickle cell    FAMILY HISTORY: His father  at 90 from age related condition. His mother  at 54 from a heart attack. He has 5 brothers 2 of whom are  from sickle cell disease. He has 4 sisters who are alive and 1 has uterine cancer. No other sibs are known to have sickle trait. He has 1 son and 2 daughters who are healthy.     Most recently seen by Heme-Onc 2024.  Suspected stage IV colon cancer with new hypermetabolic liver lesions.  On Xeloda and irinotecan    Labs done late August   Lipid profile normal   A1c 6.1   PSA normal      Today's visit:    History of Present Illness    CHIEF COMPLAINT:  Terrie presents today for follow-up.    ONCOLOGY TREATMENT:  He reports ongoing chemotherapy with Xeloda (Capecitabine) for colon cancer. Radiation therapy was completed in  of this year. He has a port on the left side of his chest for fluid administration. An upcoming oncology appointment is scheduled for next week to continue chemotherapy treatment.    CHEMOTHERAPY SIDE EFFECTS:  He experiences numbness in his hands and tingling in his toes, which does not affect his sleep. His left thumb had swelling, which improved with epsom salt soaks. He reports skin stiffening on his face and hands, managed with lotion application. Hair regrowth is slow following treatment. He has intermittent nausea, for which he has medication at  home.    GASTROINTESTINAL ISSUES:  He reports mild stool issues and some straining during bowel movements, currently taking MiraLax as a stool softener which has provided some relief. He denies bright red blood in stool.    MEDICATIONS AND SUPPLEMENTS:  He is taking Xeloda (Capecitabine) for chemotherapy. He is not currently taking vitamin D supplements due to concerns about medication interactions. He uses lotion for his hands and face to manage skin-related side effects of chemotherapy, reporting improvement in skin softness and hand condition.    LAB RESULTS:  His cholesterol panel shows improvement: Total cholesterol decreased from 208 to 172, triglycerides reduced from 187 to 111, HDL is 45 (within desired range), and LDL is at 104 (acceptable). A1C remains in the pre-diabetes range, consistent with previous results. He expresses interest in lowering his A1C.    DIET AND HYDRATION:  He reports consuming watermelon, grapes, and strawberries. He drinks zero-sugar Gatorade to stay hydrated and maintain fluid intake.    WORK STATUS:  He works as a  for approximately 36 hours per week, reporting ability to handle this schedule without difficulty despite ongoing medical issues.      ROS:  General: no fatigue  Gastrointestinal: reports nausea, no bright red blood per rectum  Musculoskeletal: reports joint swelling  Neurological: reports numbness, reports tingling         ALLERGIES AND MEDICATIONS: updated and reviewed.  Medication List with Changes/Refills   Current Medications    ASCORBIC ACID, VITAMIN C, (VITAMIN C) 500 MG TABLET    Take 500 mg by mouth.    ERGOCALCIFEROL, VITAMIN D2, 10 MCG (400 UNIT) TAB    Take 5,000 mg by mouth.    ONDANSETRON (ZOFRAN-ODT) 4 MG TBDL    Take 1 tablet (4 mg total) by mouth every 6 (six) hours as needed (Nausea).    ZINC GLUCONATE 50 MG TABLET    Take 50 mg by mouth.         Objective:   Objective   Physical Exam   Vitals:    09/18/24 1608   BP: 124/70   Pulse: 88    Temp: 98.3 °F (36.8 °C)   TempSrc: Oral   SpO2: 97%   Weight: 111.3 kg (245 lb 4.2 oz)   Height: 6' (1.829 m)    Body mass index is 33.26 kg/m².            Physical Exam  Constitutional:       Appearance: Normal appearance. He is well-developed.   HENT:      Right Ear: Tympanic membrane and external ear normal.      Left Ear: Tympanic membrane and external ear normal.      Nose: Nose normal.   Eyes:      General: No scleral icterus.     Conjunctiva/sclera: Conjunctivae normal.   Neck:      Thyroid: No thyroid mass or thyromegaly.      Trachea: Trachea normal.   Cardiovascular:      Rate and Rhythm: Normal rate and regular rhythm.      Heart sounds: Normal heart sounds, S1 normal and S2 normal. No murmur heard.  Pulmonary:      Effort: Pulmonary effort is normal.      Breath sounds: Normal breath sounds.   Abdominal:      General: There is no distension.      Palpations: Abdomen is soft. There is no hepatomegaly, splenomegaly or mass.      Tenderness: There is no abdominal tenderness.   Musculoskeletal:         General: No deformity.      Right lower leg: No edema.      Left lower leg: No edema.   Lymphadenopathy:      Cervical: No cervical adenopathy.   Skin:     General: Skin is warm and dry.      Findings: No rash (on exposed skin).      Comments: On exposed skin   Neurological:      Mental Status: He is alert and oriented to person, place, and time.      Cranial Nerves: No cranial nerve deficit.      Sensory: No sensory deficit.      Deep Tendon Reflexes: Reflexes are normal and symmetric.   Psychiatric:         Speech: Speech normal.         Behavior: Behavior normal.         Thought Content: Thought content normal.         Judgment: Judgment normal.         Component      Latest Ref Rn 8/3/2023 8/21/2024 8/28/2024   Cholesterol Total      120 - 199 mg/dL 208 (H)   172    Triglycerides      30 - 150 mg/dL 187 (H)   111    HDL      40 - 75 mg/dL 51   45    LDL Cholesterol      63.0 - 159.0 mg/dL 119.6   104.8     HDL/Cholesterol Ratio      20.0 - 50.0 % 24.5   26.2    Total Cholesterol/HDL Ratio      2.0 - 5.0  4.1   3.8    Non-HDL Cholesterol      mg/dL 157   127    Hemoglobin A1C External      4.0 - 5.6 % 6.2 (H)  6.1 (H)     Estimated Avg Glucose      68 - 131 mg/dL 131  128     Prostate Specific Antigen      0.00 - 4.00 ng/mL   1.2       Legend:  (H) High

## 2024-09-18 ENCOUNTER — OFFICE VISIT (OUTPATIENT)
Dept: FAMILY MEDICINE | Facility: CLINIC | Age: 59
End: 2024-09-18
Payer: COMMERCIAL

## 2024-09-18 VITALS
WEIGHT: 245.25 LBS | HEIGHT: 72 IN | TEMPERATURE: 98 F | OXYGEN SATURATION: 97 % | DIASTOLIC BLOOD PRESSURE: 70 MMHG | HEART RATE: 88 BPM | SYSTOLIC BLOOD PRESSURE: 124 MMHG | BODY MASS INDEX: 33.22 KG/M2

## 2024-09-18 DIAGNOSIS — Z00.00 NORMAL PHYSICAL EXAM: Primary | ICD-10-CM

## 2024-09-18 DIAGNOSIS — D84.821 DRUG-INDUCED IMMUNODEFICIENCY: ICD-10-CM

## 2024-09-18 DIAGNOSIS — R73.03 PREDIABETES: ICD-10-CM

## 2024-09-18 DIAGNOSIS — C18.6 MALIGNANT NEOPLASM OF DESCENDING COLON: ICD-10-CM

## 2024-09-18 DIAGNOSIS — Z79.899 DRUG-INDUCED IMMUNODEFICIENCY: ICD-10-CM

## 2024-09-18 PROCEDURE — 1160F RVW MEDS BY RX/DR IN RCRD: CPT | Mod: CPTII,S$GLB,, | Performed by: INTERNAL MEDICINE

## 2024-09-18 PROCEDURE — 3044F HG A1C LEVEL LT 7.0%: CPT | Mod: CPTII,S$GLB,, | Performed by: INTERNAL MEDICINE

## 2024-09-18 PROCEDURE — 1159F MED LIST DOCD IN RCRD: CPT | Mod: CPTII,S$GLB,, | Performed by: INTERNAL MEDICINE

## 2024-09-18 PROCEDURE — 99396 PREV VISIT EST AGE 40-64: CPT | Mod: S$GLB,,, | Performed by: INTERNAL MEDICINE

## 2024-09-18 PROCEDURE — 3074F SYST BP LT 130 MM HG: CPT | Mod: CPTII,S$GLB,, | Performed by: INTERNAL MEDICINE

## 2024-09-18 PROCEDURE — 99999 PR PBB SHADOW E&M-EST. PATIENT-LVL III: CPT | Mod: PBBFAC,,, | Performed by: INTERNAL MEDICINE

## 2024-09-18 PROCEDURE — 3078F DIAST BP <80 MM HG: CPT | Mod: CPTII,S$GLB,, | Performed by: INTERNAL MEDICINE

## 2024-09-18 PROCEDURE — 3008F BODY MASS INDEX DOCD: CPT | Mod: CPTII,S$GLB,, | Performed by: INTERNAL MEDICINE
